# Patient Record
Sex: MALE | Race: WHITE | NOT HISPANIC OR LATINO | Employment: FULL TIME | URBAN - METROPOLITAN AREA
[De-identification: names, ages, dates, MRNs, and addresses within clinical notes are randomized per-mention and may not be internally consistent; named-entity substitution may affect disease eponyms.]

---

## 2017-10-31 ENCOUNTER — ALLIED HEALTH/NURSE VISIT (OUTPATIENT)
Dept: NURSING | Facility: CLINIC | Age: 57
End: 2017-10-31
Payer: COMMERCIAL

## 2017-10-31 DIAGNOSIS — Z23 NEED FOR PROPHYLACTIC VACCINATION AND INOCULATION AGAINST INFLUENZA: Primary | ICD-10-CM

## 2017-10-31 PROCEDURE — 90686 IIV4 VACC NO PRSV 0.5 ML IM: CPT

## 2017-10-31 PROCEDURE — 99207 ZZC NO CHARGE NURSE ONLY: CPT

## 2017-10-31 PROCEDURE — 90471 IMMUNIZATION ADMIN: CPT

## 2017-10-31 NOTE — MR AVS SNAPSHOT
"              After Visit Summary   10/31/2017    Wyatt Dhillon    MRN: [id number]           Patient Information     Date Of Birth          1960        Visit Information        Provider Department      10/31/2017 2:30 PM [facility] [place] NURSE [facility]        Today's Diagnoses     Need for prophylactic vaccination and inoculation against influenza    -  1       Follow-ups after your visit        Who to contact     If you have questions or need follow up information about today's clinic visit or your schedule please contact [facility] directly at [phone].  Normal or non-critical lab and imaging results will be communicated to you by Kitanihart, letter or phone within 4 business days after the clinic has received the results. If you do not hear from us within 7 days, please contact the clinic through datapinet or phone. If you have a critical or abnormal lab result, we will notify you by phone as soon as possible.  Submit refill requests through [employer] or call your pharmacy and they will forward the refill request to us. Please allow 3 business days for your refill to be completed.          Additional Information About Your Visit        MyChart Information     [employer] lets you send messages to your doctor, view your test results, renew your prescriptions, schedule appointments and more. To sign up, go to [email or web address] . Click on \"Log in\" on the left side of the screen, which will take you to the Welcome page. Then click on \"Sign up Now\" on the right side of the page.     You will be asked to enter the access code listed below, as well as some personal information. Please follow the directions to create your username and password.     Your access code is: [email or web address]  Expires: 2018  2:43 PM     Your access code will  in 90 days. If you need help or a new code, please call your [facility] or [phone].        Care EveryWhere ID     This is your Care EveryWhere ID. " This could be used by other organizations to access your [place] medical records  [id number]         Blood Pressure from Last 3 Encounters:   09/02/16 126/84   08/10/15 133/89   06/13/14 104/80    Weight from Last 3 Encounters:   09/02/16 212 lb (96.2 kg)   08/10/15 200 lb 14.4 oz (91.1 kg)   06/13/14 205 lb (93 kg)              We Performed the Following     FLU VAC, SPLIT VIRUS IM > 3 YO (QUADRIVALENT) [39149]     Vaccine Administration, Initial [75305]        Primary Care Provider Office Phone # Fax #    Wyatt Loco [phone] [address] [address]        Equal Access to Services     LYNDA LEE : Hadii lay rincon hadasho Sovaleriy, waaxda luqadaha, qaybta kaalmada adeegyalanre, nimesh lee . So [facility] [phone].    ATENCIÓN: Si habla español, tiene a stevenson disposición servicios gratuitos de asistencia lingüística. Llame al [phone].    We comply with applicable federal civil rights laws and [place] laws. We do not discriminate on the basis of race, color, national origin, age, disability, sex, sexual orientation, or gender identity.            Thank you!     Thank you for choosing [facility]  for your care. Our goal is always to provide you with excellent care. Hearing back from our patients is one way we can continue to improve our services. Please take a few minutes to complete the written survey that you may receive in the mail after your visit with us. Thank you!             Your Updated Medication List - Protect others around you: Learn how to safely use, store and throw away your medicines at www.disposemymeds.org.          This list is accurate as of: 10/31/17  2:43 PM.  Always use your most recent med list.                   Brand Name Dispense Instructions for use Diagnosis    ANTI-GAS Caps           BENADRYL ALLERGY 25 MG tablet   Generic drug:  diphenhydrAMINE      Take 25 mg by mouth every 6 hours as needed.         ibuprofen 200 MG tablet    ADVIL/MOTRIN     Take 200 mg by mouth every 4 hours as needed.

## 2017-10-31 NOTE — PROGRESS NOTES

## 2018-03-20 NOTE — PROGRESS NOTES
SUBJECTIVE:   CC: Wyatt Dhillon is an 57 year old male who presents for preventative health visit.     The patient is doing well and working out reg but weight up.  Occasionally has gerd, no chest pain or shortness of breath, no dysphagia.  He otherwise feels fine.    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  He is part owner of [employer].    Healthy Habits:    Do you get at least three servings of calcium containing foods daily (dairy, green leafy vegetables, etc.)? yes    Amount of exercise or daily activities, outside of work: 4 day(s) per week    Problems taking medications regularly No    Medication side effects: No    Have you had an eye exam in the past two years? yes    Do you see a dentist twice per year? yes    Do you have sleep apnea, excessive snoring or daytime drowsiness?yes           Today's PHQ-2 Score:   PHQ-2 ( 1999 Pfizer) 9/2/2016 8/10/2015   Q1: Little interest or pleasure in doing things 0 0   Q2: Feeling down, depressed or hopeless 0 0   PHQ-2 Score 0 0       Abuse: Current or Past(Physical, Sexual or Emotional)- No  Do you feel safe in your environment - Yes               Past Medical History:      Past Medical History:   Diagnosis Date     Anal fistula 2001    s/p repair     Carpal tunnel syndrome on both sides 2012     Cervical disc herniation 1986    C5-6     Elevated blood sugar      Gastroenteritis     while in [place]     Hx of colonoscopy 2008    nl     Insufficient sleep syndrome 2012    sleep test done     Normal stress echocardiogram 2002     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    one year of meds             Past Surgical History:      Past Surgical History:   Procedure Laterality Date     C REPAIR  ANAL FISTULA,RECT ADV FLAP  2001             Social History:     Social History     Social History     Marital status:      Spouse name: N/A     Number of children: 4     Years of education: N/A  "    Occupational History     runs shoe company [employer]     Social History Main Topics     Smoking status: Never Smoker     Smokeless tobacco: Never Used     Alcohol use 4.2 oz/week     7 Standard drinks or equivalent per week      Comment: drinks about 4 days/wk, 7 glasses of wine per week     Drug use: No     Sexual activity: Yes     Partners: Female     Other Topics Concern     Not on file     Social History Narrative             Family History:   reviewed         Allergies:     Allergies   Allergen Reactions     Codeine Camsylate Rash             Medications:     Current Outpatient Prescriptions   Medication Sig Dispense Refill     Alpha-D-Galactosidase (ANTI-GAS) CAPS        ibuprofen (ADVIL,MOTRIN) 200 MG tablet Take 200 mg by mouth every 4 hours as needed.       diphenhydrAMINE (BENADRYL ALLERGY) 25 MG tablet Take 25 mg by mouth every 6 hours as needed.                 Review of Systems:   The 10 point Review of Systems is negative other than noted in the HPI           Physical Exam:   Blood pressure 134/84, pulse 74, temperature 98.3  F (36.8  C), temperature source Oral, height 6' 3\" (1.905 m), weight 213 lb (96.6 kg), SpO2 100 %.    Exam:  Constitutional: healthy appearing, alert and in no distress  Heent: Normocephalic. Head without obvious masses or lesions. PERRLDC, EOMI. Mouth exam within normal limits: tongue, mucous membranes, posterior pharynx all normal, no lesions or abnormalities seen.  Tm's and canals within normal limits bilaterally. Neck supple, no nuchal rigidity or masses. No supraclavicular, or cervical adenopathy. Thyroid symmetric, no masses.  Cardiovascular: Regular rate and rhythm, no murmer, rub or gallops.  JVP not elevated, no edema.  Carotids within normal limits bilaterally, no bruits.  Respiratory: Normal respiratory effort.  Lungs clear, normal flow, no wheezing or crackles.  Breasts: Normal bilaterally.  No masses or lesions.  Nipples within normal limites.  No " axillary lesions or nodes.  Gastrointestinal: Normal active bowel sounds.   Soft, not tender, no masses, guarding or rebound.  No hepatosplenomegaly.   Genitourinary: Rectal min bph  Musculoskeletal: extremities normal, no gross deformities noted.  Skin: no suspicious lesions or rashes   Neurologic: Mental status within normal limits.  Speech fluent.  No gross motor abnormalities and gait intact.  Psychiatric: mentation appears normal and affect normal.         Data:   Labs sent; ekg - nsr, within normal limits.        Assessment:   1. Normal complete physical exam  2. Gerd, suspect more due to weight gain, doubt cv, rec exercise, diet, cut down alcohol and call if not resolving or changes  3. Elevated sugar, follow up labs  4. hcm         Plan:   He will get new shingles shot but did not want today  Exercise, diet  Letter with labs  Call if problems  Colon at the end of the year      Wyatt Loco M.D.

## 2018-03-22 ENCOUNTER — OFFICE VISIT (OUTPATIENT)
Dept: FAMILY MEDICINE | Facility: CLINIC | Age: 58
End: 2018-03-22
Payer: COMMERCIAL

## 2018-03-22 VITALS
OXYGEN SATURATION: 100 % | TEMPERATURE: 98.3 F | HEART RATE: 74 BPM | HEIGHT: 75 IN | WEIGHT: 213 LBS | BODY MASS INDEX: 26.49 KG/M2 | SYSTOLIC BLOOD PRESSURE: 134 MMHG | DIASTOLIC BLOOD PRESSURE: 84 MMHG

## 2018-03-22 DIAGNOSIS — R73.9 ELEVATED BLOOD SUGAR: ICD-10-CM

## 2018-03-22 DIAGNOSIS — Z00.00 ROUTINE GENERAL MEDICAL EXAMINATION AT A HEALTH CARE FACILITY: Primary | ICD-10-CM

## 2018-03-22 DIAGNOSIS — K21.9 GASTROESOPHAGEAL REFLUX DISEASE WITHOUT ESOPHAGITIS: ICD-10-CM

## 2018-03-22 LAB
ALBUMIN SERPL-MCNC: 4.1 G/DL (ref 3.4–5)
ALP SERPL-CCNC: 78 U/L (ref 40–150)
ALT SERPL W P-5'-P-CCNC: 59 U/L (ref 0–70)
ANION GAP SERPL CALCULATED.3IONS-SCNC: 3 MMOL/L (ref 3–14)
AST SERPL W P-5'-P-CCNC: 39 U/L (ref 0–45)
BASOPHILS # BLD AUTO: 0 10E9/L (ref 0–0.2)
BASOPHILS NFR BLD AUTO: 0.7 %
BILIRUB SERPL-MCNC: 0.7 MG/DL (ref 0.2–1.3)
BUN SERPL-MCNC: 18 MG/DL (ref 7–30)
CALCIUM SERPL-MCNC: 9.1 MG/DL (ref 8.5–10.1)
CHLORIDE SERPL-SCNC: 102 MMOL/L (ref 94–109)
CHOLEST SERPL-MCNC: 183 MG/DL
CO2 SERPL-SCNC: 31 MMOL/L (ref 20–32)
CREAT SERPL-MCNC: 0.98 MG/DL (ref 0.66–1.25)
DIFFERENTIAL METHOD BLD: NORMAL
EOSINOPHIL # BLD AUTO: 0 10E9/L (ref 0–0.7)
EOSINOPHIL NFR BLD AUTO: 0.9 %
ERYTHROCYTE [DISTWIDTH] IN BLOOD BY AUTOMATED COUNT: 13 % (ref 10–15)
GFR SERPL CREATININE-BSD FRML MDRD: 78 ML/MIN/1.7M2
GLUCOSE SERPL-MCNC: 102 MG/DL (ref 70–99)
HBA1C MFR BLD: 5.8 % (ref 4.3–6)
HCT VFR BLD AUTO: 45.1 % (ref 40–53)
HDLC SERPL-MCNC: 72 MG/DL
HGB BLD-MCNC: 15.2 G/DL (ref 13.3–17.7)
LDLC SERPL CALC-MCNC: 83 MG/DL
LYMPHOCYTES # BLD AUTO: 1.6 10E9/L (ref 0.8–5.3)
LYMPHOCYTES NFR BLD AUTO: 37.6 %
MCH RBC QN AUTO: 32.1 PG (ref 26.5–33)
MCHC RBC AUTO-ENTMCNC: 33.7 G/DL (ref 31.5–36.5)
MCV RBC AUTO: 95 FL (ref 78–100)
MONOCYTES # BLD AUTO: 0.6 10E9/L (ref 0–1.3)
MONOCYTES NFR BLD AUTO: 13 %
NEUTROPHILS # BLD AUTO: 2 10E9/L (ref 1.6–8.3)
NEUTROPHILS NFR BLD AUTO: 47.8 %
NONHDLC SERPL-MCNC: 111 MG/DL
PLATELET # BLD AUTO: 211 10E9/L (ref 150–450)
POTASSIUM SERPL-SCNC: 3.9 MMOL/L (ref 3.4–5.3)
PROT SERPL-MCNC: 7.6 G/DL (ref 6.8–8.8)
PSA SERPL-ACNC: 1.17 UG/L (ref 0–4)
RBC # BLD AUTO: 4.73 10E12/L (ref 4.4–5.9)
SODIUM SERPL-SCNC: 136 MMOL/L (ref 133–144)
TRIGL SERPL-MCNC: 141 MG/DL
WBC # BLD AUTO: 4.2 10E9/L (ref 4–11)

## 2018-03-22 PROCEDURE — G0103 PSA SCREENING: HCPCS | Performed by: INTERNAL MEDICINE

## 2018-03-22 PROCEDURE — 93010 ELECTROCARDIOGRAM REPORT: CPT | Performed by: INTERNAL MEDICINE

## 2018-03-22 PROCEDURE — 36415 COLL VENOUS BLD VENIPUNCTURE: CPT | Performed by: INTERNAL MEDICINE

## 2018-03-22 PROCEDURE — 80053 COMPREHEN METABOLIC PANEL: CPT | Performed by: INTERNAL MEDICINE

## 2018-03-22 PROCEDURE — 80061 LIPID PANEL: CPT | Performed by: INTERNAL MEDICINE

## 2018-03-22 PROCEDURE — 85025 COMPLETE CBC W/AUTO DIFF WBC: CPT | Performed by: INTERNAL MEDICINE

## 2018-03-22 PROCEDURE — 99396 PREV VISIT EST AGE 40-64: CPT | Performed by: INTERNAL MEDICINE

## 2018-03-22 PROCEDURE — 83036 HEMOGLOBIN GLYCOSYLATED A1C: CPT | Performed by: INTERNAL MEDICINE

## 2018-03-22 NOTE — NURSING NOTE
"Chief Complaint   Patient presents with     Physical       Initial /90  Pulse 74  Temp 98.3  F (36.8  C) (Oral)  Ht 6' 3\" (1.905 m)  Wt 213 lb (96.6 kg)  SpO2 100%  BMI 26.62 kg/m2 Estimated body mass index is 26.62 kg/(m^2) as calculated from the following:    Height as of this encounter: 6' 3\" (1.905 m).    Weight as of this encounter: 213 lb (96.6 kg).  Medication Reconciliation: complete   Maria L Page CMA      "

## 2018-03-22 NOTE — LETTER
[facility]  [address] [facility]  [address]  [place]  [address]  Tel: [phone]    March 22, 2018    Wyatt JAXON Dhillon  [address] [facility] [address]        Dear Mr. Dhillon,    It was a pleasure seeing you for your physical examination.  I wanted to get back to you with your test results.  I have enclosed a copy for your review.       I am happy to report that your cbc or complete blood count is normal with no signs of anemia, leukemia or platelet abnormalities.  Your chemistry panel shows no diabetes.  Your blood salts, kidney tests, liver tests, psa, ekg, and proteins are all fine.     Your total cholesterol is 183 with the normal range being below 200.  Your HDL or good cholesterol is 72 with the normal range being above 40.  Your LDL or bad cholesterol is 83 with the normal range being below 130.  These numbers are very good.     I am happy to bring you this overall excellent report.  Please be sure to exercise and keep your weight down.  If you have any questions please call me.   Sincerely,    Wyatt Loco MD        Results for orders placed or performed in visit on 03/22/18   CBC with platelets differential   Result Value Ref Range    WBC 4.2 4.0 - 11.0 10e9/L    RBC Count 4.73 4.4 - 5.9 10e12/L    Hemoglobin 15.2 13.3 - 17.7 g/dL    Hematocrit 45.1 40.0 - 53.0 %    MCV 95 78 - 100 fl    MCH 32.1 26.5 - 33.0 pg    MCHC 33.7 31.5 - 36.5 g/dL    RDW 13.0 10.0 - 15.0 %    Platelet Count 211 150 - 450 10e9/L    Diff Method Automated Method     % Neutrophils 47.8 %    % Lymphocytes 37.6 %    % Monocytes 13.0 %    % Eosinophils 0.9 %    % Basophils 0.7 %    Absolute Neutrophil 2.0 1.6 - 8.3 10e9/L    Absolute Lymphocytes 1.6 0.8 - 5.3 10e9/L    Absolute Monocytes 0.6 0.0 - 1.3 10e9/L    Absolute Eosinophils 0.0 0.0 - 0.7 10e9/L    Absolute Basophils 0.0 0.0 - 0.2 10e9/L   Comprehensive metabolic panel   Result Value Ref Range    Sodium 136 133 - 144 mmol/L    Potassium 3.9 3.4 -  5.3 mmol/L    Chloride 102 94 - 109 mmol/L    Carbon Dioxide 31 20 - 32 mmol/L    Anion Gap 3 3 - 14 mmol/L    Glucose 102 (H) 70 - 99 mg/dL    Urea Nitrogen 18 7 - 30 mg/dL    Creatinine 0.98 0.66 - 1.25 mg/dL    GFR Estimate 78 >60 mL/min/1.7m2    GFR Estimate If Black >90 >60 mL/min/1.7m2    Calcium 9.1 8.5 - 10.1 mg/dL    Bilirubin Total 0.7 0.2 - 1.3 mg/dL    Albumin 4.1 3.4 - 5.0 g/dL    Protein Total 7.6 6.8 - 8.8 g/dL    Alkaline Phosphatase 78 40 - 150 U/L    ALT 59 0 - 70 U/L    AST 39 0 - 45 U/L   Lipid panel reflex to direct LDL Non-fasting   Result Value Ref Range    Cholesterol 183 <200 mg/dL    Triglycerides 141 <150 mg/dL    HDL Cholesterol 72 >39 mg/dL    LDL Cholesterol Calculated 83 <100 mg/dL    Non HDL Cholesterol 111 <130 mg/dL   Prostate spec antigen screen   Result Value Ref Range    PSA 1.17 0 - 4 ug/L   Hemoglobin A1c   Result Value Ref Range    Hemoglobin A1C 5.8 4.3 - 6.0 %

## 2018-03-22 NOTE — MR AVS SNAPSHOT
After Visit Summary   3/22/2018    Wyatt Dhillon    MRN: [id number]           Patient Information     Date Of Birth          1960        Visit Information        Provider Department      3/22/2018 9:00 AM Wyatt Loco MD [facility]        Today's Diagnoses     Routine general medical examination at a health care facility    -  1    Elevated blood sugar        Gastroesophageal reflux disease without esophagitis          Care Instructions      Preventive Health Recommendations  Male Ages 50 - 64    Yearly exam:             See your health care provider every year in order to  o   Review health changes.   o   Discuss preventive care.    o   Review your medicines if your doctor has prescribed any.     Have a cholesterol test every 5 years, or more frequently if you are at risk for high cholesterol/heart disease.     Have a diabetes test (fasting glucose) every three years. If you are at risk for diabetes, you should have this test more often.     Have a colonoscopy at age 50, or have a yearly FIT test (stool test). These exams will check for colon cancer.      Talk with your health care provider about whether or not a prostate cancer screening test (PSA) is right for you.    You should be tested each year for STDs (sexually transmitted diseases), if you re at risk.     Shots: Get a flu shot each year. Get a tetanus shot every 10 years.     Nutrition:    Eat at least 5 servings of fruits and vegetables daily.     Eat whole-grain bread, whole-wheat pasta and brown rice instead of white grains and rice.     Talk to your provider about Calcium and Vitamin D.     Lifestyle    Exercise for at least 150 minutes a week (30 minutes a day, 5 days a week). This will help you control your weight and prevent disease.     Limit alcohol to one drink per day.     No smoking.     Wear sunscreen to prevent skin cancer.     See your dentist every six months for an exam and cleaning.     See your  "eye doctor every 1 to 2 years.            Follow-ups after your visit        Who to contact     If you have questions or need follow up information about today's clinic visit or your schedule please contact [facility] directly at [phone].  Normal or non-critical lab and imaging results will be communicated to you by MyChart, letter or phone within 4 business days after the clinic has received the results. If you do not hear from us within 7 days, please contact the clinic through [employer] or phone. If you have a critical or abnormal lab result, we will notify you by phone as soon as possible.  Submit refill requests through [employer] or call your pharmacy and they will forward the refill request to us. Please allow 3 business days for your refill to be completed.          Additional Information About Your Visit        [employer] Information     [employer] lets you send messages to your doctor, view your test results, renew your prescriptions, schedule appointments and more. To sign up, go to [email or web address] . Click on \"Log in\" on the left side of the screen, which will take you to the Welcome page. Then click on \"Sign up Now\" on the right side of the page.     You will be asked to enter the access code listed below, as well as some personal information. Please follow the directions to create your username and password.     Your access code is: [email or web address]  Expires: 2018  9:12 AM     Your access code will  in 90 days. If you need help or a new code, please call your [facility] or [phone].        Care EveryWhere ID     This is your Care EveryWhere ID. This could be used by other organizations to access your [place] medical records  [id number]        Your Vitals Were     Pulse Temperature Height Pulse Oximetry BMI (Body Mass Index)       74 98.3  F (36.8  C) (Oral) 6' 3\" (1.905 m) 100% 26.62 kg/m2        Blood Pressure from Last 3 Encounters:   18 134/84   16 126/84 "   08/10/15 133/89    Weight from Last 3 Encounters:   03/22/18 213 lb (96.6 kg)   09/02/16 212 lb (96.2 kg)   08/10/15 200 lb 14.4 oz (91.1 kg)              We Performed the Following     CBC with platelets differential     Comprehensive metabolic panel     EKG 12-LEAD CLINIC READ     Hemoglobin A1c     Lipid panel reflex to direct LDL Non-fasting     Prostate spec antigen screen        Primary Care Provider Office Phone # Fax #    Wyatt Loco [phone] [address] [facility] [address]  [facility] [address]        Equal Access to Services     [facility]: Hadii aad ku hadasho Sovaleriy, waaxda luqadaha, qaybta kaalmada adereggieyalanre, nimesh lee . So [facility] [phone].    ATENCIÓN: Si habla español, tiene a stevenson disposición servicios gratuitos de asistencia lingüística. [facility] [phone].    We comply with applicable federal civil rights laws and [place] laws. We do not discriminate on the basis of race, color, national origin, age, disability, sex, sexual orientation, or gender identity.            Thank you!     Thank you for choosing [facility]  for your care. Our goal is always to provide you with excellent care. Hearing back from our patients is one way we can continue to improve our services. Please take a few minutes to complete the written survey that you may receive in the mail after your visit with us. Thank you!             Your Updated Medication List - Protect others around you: Learn how to safely use, store and throw away your medicines at www.disposemymeds.org.          This list is accurate as of 3/22/18  9:12 AM.  Always use your most recent med list.                   Brand Name Dispense Instructions for use Diagnosis    ANTI-GAS Caps           BENADRYL ALLERGY 25 MG tablet   Generic drug:  diphenhydrAMINE      Take 25 mg by mouth every 6 hours as needed.        ibuprofen 200 MG tablet    ADVIL/MOTRIN     Take 200 mg by mouth every 4  hours as needed.

## 2018-03-22 NOTE — PROGRESS NOTES
It was a pleasure seeing you for your physical examination.  I wanted to get back to you with your test results.  I have enclosed a copy for your review.      I am happy to report that your cbc or complete blood count is normal with no signs of anemia, leukemia or platelet abnormalities.  Your chemistry panel shows no diabetes.  Your blood salts, kidney tests, liver tests, psa, ekg, and proteins are all fine.    Your total cholesterol is 183 with the normal range being below 200.  Your HDL or good cholesterol is 72 with the normal range being above 40.  Your LDL or bad cholesterol is 83 with the normal range being below 130.  These numbers are very good.    I am happy to bring you this overall excellent report.  Please be sure to exercise and keep your weight down.  If you have any questions please call me.

## 2018-10-11 ENCOUNTER — OFFICE VISIT (OUTPATIENT)
Dept: FAMILY MEDICINE | Facility: CLINIC | Age: 58
End: 2018-10-11
Payer: COMMERCIAL

## 2018-10-11 VITALS
WEIGHT: 200 LBS | DIASTOLIC BLOOD PRESSURE: 82 MMHG | BODY MASS INDEX: 25 KG/M2 | HEART RATE: 81 BPM | OXYGEN SATURATION: 99 % | SYSTOLIC BLOOD PRESSURE: 138 MMHG | RESPIRATION RATE: 16 BRPM | TEMPERATURE: 97.9 F

## 2018-10-11 DIAGNOSIS — J01.00 ACUTE MAXILLARY SINUSITIS, RECURRENCE NOT SPECIFIED: ICD-10-CM

## 2018-10-11 DIAGNOSIS — J01.91 ACUTE RECURRENT SINUSITIS, UNSPECIFIED LOCATION: Primary | ICD-10-CM

## 2018-10-11 PROCEDURE — 99213 OFFICE O/P EST LOW 20 MIN: CPT | Performed by: INTERNAL MEDICINE

## 2018-10-11 RX ORDER — PREDNISONE 20 MG/1
20 TABLET ORAL 2 TIMES DAILY
Qty: 10 TABLET | Refills: 1 | Status: ON HOLD | OUTPATIENT
Start: 2018-10-11 | End: 2019-01-07

## 2018-10-11 RX ORDER — AZITHROMYCIN 250 MG/1
TABLET, FILM COATED ORAL
Qty: 6 TABLET | Refills: 2 | Status: ON HOLD | OUTPATIENT
Start: 2018-10-11 | End: 2019-01-07

## 2018-10-11 NOTE — PROGRESS NOTES
SUBJECTIVE:   Wyatt Dhillon is a 58 year old male who presents to clinic today for the following health issues:      RESPIRATORY SYMPTOMS      Duration: 6 days    Description  cough, fatigue/malaise and congestion    Severity: mild    Accompanying signs and symptoms: None    History (predisposing factors):  none    Precipitating or alleviating factors: None    Therapies tried and outcome:  rest and fluids        Current Medications:     Current Outpatient Prescriptions   Medication Sig Dispense Refill     azithromycin (ZITHROMAX) 250 MG tablet Two tablets first day, then one tablet daily for four days. 6 tablet 2     predniSONE (DELTASONE) 20 MG tablet Take 1 tablet (20 mg) by mouth 2 times daily 10 tablet 1     Alpha-D-Galactosidase (ANTI-GAS) CAPS        diphenhydrAMINE (BENADRYL ALLERGY) 25 MG tablet Take 25 mg by mouth every 6 hours as needed.       ibuprofen (ADVIL,MOTRIN) 200 MG tablet Take 200 mg by mouth every 4 hours as needed.           Allergies:      Allergies   Allergen Reactions     Codeine Camsylate Rash            Past Medical History:     Past Medical History:   Diagnosis Date     Anal fistula 2001    s/p repair     Carpal tunnel syndrome on both sides 2012     Cervical disc herniation 1986    C5-6     Elevated blood sugar      Gastroenteritis     while in [place]     Hx of colonoscopy 2008    nl     Insufficient sleep syndrome 2012    sleep test done     Normal stress echocardiogram 2002     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    one year of meds         Past Surgical History:     Past Surgical History:   Procedure Laterality Date     C REPAIR  ANAL FISTULA,RECT ADV FLAP  2001         Family Medical History:     Family History   Problem Relation Age of Onset     Cancer Father      skin - squamous     Hypertension Father      Diabetes Mother      Hypertension Mother      Cancer - colorectal Maternal Grandfather      HEART DISEASE Maternal Grandfather      MI     Arthritis Paternal Grandfather      Cerebrovascular  Disease No family hx of          Social History:     Social History     Social History     Marital status:      Spouse name: N/A     Number of children: 4     Years of education: N/A     Occupational History     Not on file.     Social History Main Topics     Smoking status: Never Smoker     Smokeless tobacco: Never Used     Alcohol use 4.2 oz/week     7 Standard drinks or equivalent per week      Comment: drinks about 4 days/wk, 7 glasses of wine per week     Drug use: No     Sexual activity: Yes     Partners: Female     Other Topics Concern     Not on file     Social History Narrative           Review of System:     Constitutional: Negative for fever or chills  Skin: Negative for rashes  Ears/Nose/Throat: Negative for nasal congestion, sore throat, nasal congestion, sinusitis symptoms present  Respiratory: positive for cough  Cardiovascular: Negative for chest pain  Gastrointestinal: Negative for nausea, vomiting  Genitourinary: Negative for dysuria, hematuria  Musculoskeletal: Negative for myalgias  Neurologic: Negative for headaches  Psychiatric: Negative for depression, anxiety  Hematologic/Lymphatic/Immunologic: Negative  Endocrine: Negative  Behavioral: Negative for tobacco use       Physical Exam:   /82 (BP Location: Right arm, Cuff Size: Adult Regular)  Pulse 81  Temp 97.9  F (36.6  C) (Tympanic)  Resp 16  Wt 200 lb (90.7 kg)  SpO2 99%  BMI 25 kg/m2    GENERAL: alert and no distress  EYES: eyes grossly normal to inspection, and conjunctivae and sclerae normal  HENT: Normocephalic atraumatic. Nose and mouth without ulcers or lesions, nasal congestion present  NECK: supple  RESP: intermittent dry coughing spells present  CV: regular rate and rhythm, normal S1 S2  LYMPH: no peripheral edema   ABDOMEN: nondistended  MS: no gross musculoskeletal defects noted  SKIN: no suspicious lesions or rashes  NEURO: Alert & Oriented x 3.   PSYCH: mentation appears normal, affect normal        Diagnostic  Test Results:     Diagnostic Test Results:  Results for orders placed or performed in visit on 03/22/18   CBC with platelets differential   Result Value Ref Range    WBC 4.2 4.0 - 11.0 10e9/L    RBC Count 4.73 4.4 - 5.9 10e12/L    Hemoglobin 15.2 13.3 - 17.7 g/dL    Hematocrit 45.1 40.0 - 53.0 %    MCV 95 78 - 100 fl    MCH 32.1 26.5 - 33.0 pg    MCHC 33.7 31.5 - 36.5 g/dL    RDW 13.0 10.0 - 15.0 %    Platelet Count 211 150 - 450 10e9/L    Diff Method Automated Method     % Neutrophils 47.8 %    % Lymphocytes 37.6 %    % Monocytes 13.0 %    % Eosinophils 0.9 %    % Basophils 0.7 %    Absolute Neutrophil 2.0 1.6 - 8.3 10e9/L    Absolute Lymphocytes 1.6 0.8 - 5.3 10e9/L    Absolute Monocytes 0.6 0.0 - 1.3 10e9/L    Absolute Eosinophils 0.0 0.0 - 0.7 10e9/L    Absolute Basophils 0.0 0.0 - 0.2 10e9/L   Comprehensive metabolic panel   Result Value Ref Range    Sodium 136 133 - 144 mmol/L    Potassium 3.9 3.4 - 5.3 mmol/L    Chloride 102 94 - 109 mmol/L    Carbon Dioxide 31 20 - 32 mmol/L    Anion Gap 3 3 - 14 mmol/L    Glucose 102 (H) 70 - 99 mg/dL    Urea Nitrogen 18 7 - 30 mg/dL    Creatinine 0.98 0.66 - 1.25 mg/dL    GFR Estimate 78 >60 mL/min/1.7m2    GFR Estimate If Black >90 >60 mL/min/1.7m2    Calcium 9.1 8.5 - 10.1 mg/dL    Bilirubin Total 0.7 0.2 - 1.3 mg/dL    Albumin 4.1 3.4 - 5.0 g/dL    Protein Total 7.6 6.8 - 8.8 g/dL    Alkaline Phosphatase 78 40 - 150 U/L    ALT 59 0 - 70 U/L    AST 39 0 - 45 U/L   Lipid panel reflex to direct LDL Non-fasting   Result Value Ref Range    Cholesterol 183 <200 mg/dL    Triglycerides 141 <150 mg/dL    HDL Cholesterol 72 >39 mg/dL    LDL Cholesterol Calculated 83 <100 mg/dL    Non HDL Cholesterol 111 <130 mg/dL   Prostate spec antigen screen   Result Value Ref Range    PSA 1.17 0 - 4 ug/L   Hemoglobin A1c   Result Value Ref Range    Hemoglobin A1C 5.8 4.3 - 6.0 %       ASSESSMENT/PLAN:       (J01.91) Acute recurrent sinusitis, unspecified location  (primary encounter  diagnosis)  Comment: several days of new recurrent sinusitis symptoms  Plan: I have prescribed predniSONE (DELTASONE) 20 MG tablet, azithromycin (ZITHROMAX) 250 MG tablet for treatment    Follow Up Plan:     Patient is instructed to return to Internal Medicine clinic for follow-up visit in 1 week.        Mary South MD  Internal Medicine  [facility]

## 2018-10-11 NOTE — MR AVS SNAPSHOT
"              After Visit Summary   10/11/2018    Wyatt Dhillon    MRN: [id number]           Patient Information     Date Of Birth          1960        Visit Information        Provider Department      10/11/2018 11:20 AM Mary South MD [facility] [place]        Today's Diagnoses     Acute recurrent sinusitis, unspecified location    -  1    Acute maxillary sinusitis, recurrence not specified           Follow-ups after your visit        Who to contact     If you have questions or need follow up information about today's clinic visit or your schedule please contact [facility] directly at [phone].  Normal or non-critical lab and imaging results will be communicated to you by MyChart, letter or phone within 4 business days after the clinic has received the results. If you do not hear from us within 7 days, please contact the clinic through [employer] or phone. If you have a critical or abnormal lab result, we will notify you by phone as soon as possible.  Submit refill requests through [employer] or call your pharmacy and they will forward the refill request to us. Please allow 3 business days for your refill to be completed.          Additional Information About Your Visit        MyChart Information     [employer] lets you send messages to your doctor, view your test results, renew your prescriptions, schedule appointments and more. To sign up, go to [email or web address] . Click on \"Log in\" on the left side of the screen, which will take you to the Welcome page. Then click on \"Sign up Now\" on the right side of the page.     You will be asked to enter the access code listed below, as well as some personal information. Please follow the directions to create your username and password.     Your access code is: [email or web address]  Expires: 2019 11:14 AM     Your access code will  in 90 days. If you need help or a new code, please call your [facility] or [phone].        Care " EveryWhere ID     This is your Care EveryWhere ID. This could be used by other organizations to access your [place] medical records  [id number]        Your Vitals Were     Pulse Temperature Respirations Pulse Oximetry BMI (Body Mass Index)       81 97.9  F (36.6  C) (Tympanic) 16 99% 25 kg/m2        Blood Pressure from Last 3 Encounters:   10/11/18 138/82   03/22/18 134/84   09/02/16 126/84    Weight from Last 3 Encounters:   10/11/18 200 lb (90.7 kg)   03/22/18 213 lb (96.6 kg)   09/02/16 212 lb (96.2 kg)              Today, you had the following     No orders found for display         Today's Medication Changes          These changes are accurate as of 10/11/18 11:47 AM.  If you have any questions, ask your nurse or doctor.               Start taking these medicines.        Dose/Directions    azithromycin 250 MG tablet   Commonly known as:  ZITHROMAX   Used for:  Acute maxillary sinusitis, recurrence not specified   Started by:  Mary South MD        Two tablets first day, then one tablet daily for four days.   Quantity:  6 tablet   Refills:  2       predniSONE 20 MG tablet   Commonly known as:  DELTASONE   Used for:  Acute recurrent sinusitis, unspecified location   Started by:  Mary South MD        Dose:  20 mg   Take 1 tablet (20 mg) by mouth 2 times daily   Quantity:  10 tablet   Refills:  1            Where to get your medicines      Some of these will need a paper prescription and others can be bought over the counter.  Ask your nurse if you have questions.     Bring a paper prescription for each of these medications     azithromycin 250 MG tablet    predniSONE 20 MG tablet                Primary Care Provider Office Phone # Fax #    Wyatt Loco [phone] [address] [facility] [address]  [facility] [address]        Equal Access to Services     LYNDA LEE AH: Ramiro Mitchell, nabeel chen, nimesh tam  lagareth lange. So [facility] [phone].    ATENCIÓN: Si habla luciana, tiene a stevenson disposición servicios gratuitos de asistencia lingüística. Anastasiia al [phone].    We comply with applicable federal civil rights laws and [place] laws. We do not discriminate on the basis of race, color, national origin, age, disability, sex, sexual orientation, or gender identity.            Thank you!     Thank you for choosing [facility]  for your care. Our goal is always to provide you with excellent care. Hearing back from our patients is one way we can continue to improve our services. Please take a few minutes to complete the written survey that you may receive in the mail after your visit with us. Thank you!             Your Updated Medication List - Protect others around you: Learn how to safely use, store and throw away your medicines at www.disposemymeds.org.          This list is accurate as of 10/11/18 11:47 AM.  Always use your most recent med list.                   Brand Name Dispense Instructions for use Diagnosis    ANTI-GAS Caps           azithromycin 250 MG tablet    ZITHROMAX    6 tablet    Two tablets first day, then one tablet daily for four days.    Acute maxillary sinusitis, recurrence not specified       BENADRYL ALLERGY 25 MG tablet   Generic drug:  diphenhydrAMINE      Take 25 mg by mouth every 6 hours as needed.        ibuprofen 200 MG tablet    ADVIL/MOTRIN     Take 200 mg by mouth every 4 hours as needed.        predniSONE 20 MG tablet    DELTASONE    10 tablet    Take 1 tablet (20 mg) by mouth 2 times daily    Acute recurrent sinusitis, unspecified location

## 2018-10-11 NOTE — NURSING NOTE
"Chief Complaint   Patient presents with     URI     initial /82 (BP Location: Right arm, Cuff Size: Adult Regular)  Pulse 81  Temp 97.9  F (36.6  C) (Tympanic)  Resp 16  Wt 200 lb (90.7 kg)  SpO2 99%  BMI 25 kg/m2 Estimated body mass index is 25 kg/(m^2) as calculated from the following:    Height as of 3/22/18: 6' 3\" (1.905 m).    Weight as of this encounter: 200 lb (90.7 kg).  BP completed using cuff size: regular.   R arm      Health Maintenance that is potentially due pending provider review:  NONE    n/a    Gunner Carter ma  "

## 2019-01-07 ENCOUNTER — HOSPITAL ENCOUNTER (OUTPATIENT)
Facility: CLINIC | Age: 59
Discharge: HOME | End: 2019-01-07
Attending: COLON & RECTAL SURGERY | Admitting: COLON & RECTAL SURGERY
Payer: COMMERCIAL

## 2019-01-07 VITALS
SYSTOLIC BLOOD PRESSURE: 117 MMHG | OXYGEN SATURATION: 98 % | HEART RATE: 67 BPM | BODY MASS INDEX: 24.96 KG/M2 | WEIGHT: 205 LBS | HEIGHT: 76 IN | DIASTOLIC BLOOD PRESSURE: 80 MMHG | RESPIRATION RATE: 7 BRPM

## 2019-01-07 LAB — COLONOSCOPY: NORMAL

## 2019-01-07 PROCEDURE — G0121 COLON CA SCRN NOT HI RSK IND: HCPCS | Performed by: COLON & RECTAL SURGERY

## 2019-01-07 PROCEDURE — G0500 MOD SEDAT ENDO SERVICE >5YRS: HCPCS | Performed by: COLON & RECTAL SURGERY

## 2019-01-07 PROCEDURE — 25000128 H RX IP 250 OP 636: Performed by: COLON & RECTAL SURGERY

## 2019-01-07 PROCEDURE — 45378 DIAGNOSTIC COLONOSCOPY: CPT | Performed by: COLON & RECTAL SURGERY

## 2019-01-07 RX ORDER — DIPHENHYDRAMINE HYDROCHLORIDE 50 MG/ML
INJECTION INTRAMUSCULAR; INTRAVENOUS PRN
Status: DISCONTINUED | OUTPATIENT
Start: 2019-01-07 | End: 2019-01-07 | Stop reason: HOSPADM

## 2019-01-07 RX ORDER — LIDOCAINE 40 MG/G
CREAM TOPICAL
Status: DISCONTINUED | OUTPATIENT
Start: 2019-01-07 | End: 2019-01-07 | Stop reason: HOSPADM

## 2019-01-07 RX ORDER — ONDANSETRON 4 MG/1
4 TABLET, ORALLY DISINTEGRATING ORAL EVERY 6 HOURS PRN
Status: DISCONTINUED | OUTPATIENT
Start: 2019-01-07 | End: 2019-01-07 | Stop reason: HOSPADM

## 2019-01-07 RX ORDER — DIPHENHYDRAMINE HYDROCHLORIDE 50 MG/ML
25 INJECTION INTRAMUSCULAR; INTRAVENOUS EVERY 4 HOURS PRN
Status: DISCONTINUED | OUTPATIENT
Start: 2019-01-07 | End: 2019-01-07 | Stop reason: HOSPADM

## 2019-01-07 RX ORDER — FENTANYL CITRATE 50 UG/ML
INJECTION, SOLUTION INTRAMUSCULAR; INTRAVENOUS PRN
Status: DISCONTINUED | OUTPATIENT
Start: 2019-01-07 | End: 2019-01-07 | Stop reason: HOSPADM

## 2019-01-07 RX ORDER — ONDANSETRON 2 MG/ML
4 INJECTION INTRAMUSCULAR; INTRAVENOUS
Status: DISCONTINUED | OUTPATIENT
Start: 2019-01-07 | End: 2019-01-07 | Stop reason: HOSPADM

## 2019-01-07 RX ORDER — DIPHENHYDRAMINE HCL 25 MG
25 CAPSULE ORAL EVERY 4 HOURS PRN
Status: DISCONTINUED | OUTPATIENT
Start: 2019-01-07 | End: 2019-01-07 | Stop reason: HOSPADM

## 2019-01-07 RX ORDER — PROCHLORPERAZINE MALEATE 10 MG
10 TABLET ORAL EVERY 6 HOURS PRN
Status: DISCONTINUED | OUTPATIENT
Start: 2019-01-07 | End: 2019-01-07 | Stop reason: HOSPADM

## 2019-01-07 RX ORDER — NALOXONE HYDROCHLORIDE 0.4 MG/ML
.1-.4 INJECTION, SOLUTION INTRAMUSCULAR; INTRAVENOUS; SUBCUTANEOUS
Status: DISCONTINUED | OUTPATIENT
Start: 2019-01-07 | End: 2019-01-07 | Stop reason: HOSPADM

## 2019-01-07 RX ORDER — ONDANSETRON 2 MG/ML
4 INJECTION INTRAMUSCULAR; INTRAVENOUS EVERY 6 HOURS PRN
Status: DISCONTINUED | OUTPATIENT
Start: 2019-01-07 | End: 2019-01-07 | Stop reason: HOSPADM

## 2019-01-07 RX ORDER — MULTIVIT WITH MINERALS/LUTEIN
1 TABLET ORAL DAILY
COMMUNITY
End: 2019-05-13

## 2019-01-07 RX ORDER — FLUMAZENIL 0.1 MG/ML
0.2 INJECTION, SOLUTION INTRAVENOUS
Status: DISCONTINUED | OUTPATIENT
Start: 2019-01-07 | End: 2019-01-07 | Stop reason: HOSPADM

## 2019-01-07 ASSESSMENT — MIFFLIN-ST. JEOR: SCORE: 1833.43

## 2019-01-08 ENCOUNTER — DOCUMENTATION ONLY (OUTPATIENT)
Dept: OTHER | Facility: CLINIC | Age: 59
End: 2019-01-08

## 2019-04-17 ENCOUNTER — TRANSFERRED RECORDS (OUTPATIENT)
Dept: HEALTH INFORMATION MANAGEMENT | Facility: CLINIC | Age: 59
End: 2019-04-17

## 2019-05-01 ENCOUNTER — TRANSFERRED RECORDS (OUTPATIENT)
Dept: HEALTH INFORMATION MANAGEMENT | Facility: CLINIC | Age: 59
End: 2019-05-01

## 2019-05-09 ENCOUNTER — TRANSFERRED RECORDS (OUTPATIENT)
Dept: HEALTH INFORMATION MANAGEMENT | Facility: CLINIC | Age: 59
End: 2019-05-09

## 2019-05-13 ENCOUNTER — OFFICE VISIT (OUTPATIENT)
Dept: FAMILY MEDICINE | Facility: CLINIC | Age: 59
End: 2019-05-13
Payer: COMMERCIAL

## 2019-05-13 VITALS
HEART RATE: 79 BPM | BODY MASS INDEX: 25.94 KG/M2 | SYSTOLIC BLOOD PRESSURE: 142 MMHG | OXYGEN SATURATION: 97 % | WEIGHT: 213 LBS | TEMPERATURE: 97.2 F | DIASTOLIC BLOOD PRESSURE: 92 MMHG | HEIGHT: 76 IN

## 2019-05-13 DIAGNOSIS — Z01.818 PREOP GENERAL PHYSICAL EXAM: Primary | ICD-10-CM

## 2019-05-13 DIAGNOSIS — M51.16 LUMBAR DISC HERNIATION WITH RADICULOPATHY: ICD-10-CM

## 2019-05-13 DIAGNOSIS — R73.9 ELEVATED BLOOD SUGAR: ICD-10-CM

## 2019-05-13 LAB
ANION GAP SERPL CALCULATED.3IONS-SCNC: 3 MMOL/L (ref 3–14)
BASOPHILS # BLD AUTO: 0 10E9/L (ref 0–0.2)
BASOPHILS NFR BLD AUTO: 0.6 %
BUN SERPL-MCNC: 22 MG/DL (ref 7–30)
CALCIUM SERPL-MCNC: 9.2 MG/DL (ref 8.5–10.1)
CHLORIDE SERPL-SCNC: 105 MMOL/L (ref 94–109)
CO2 SERPL-SCNC: 31 MMOL/L (ref 20–32)
CREAT SERPL-MCNC: 0.97 MG/DL (ref 0.66–1.25)
DIFFERENTIAL METHOD BLD: ABNORMAL
EOSINOPHIL # BLD AUTO: 0 10E9/L (ref 0–0.7)
EOSINOPHIL NFR BLD AUTO: 0.9 %
ERYTHROCYTE [DISTWIDTH] IN BLOOD BY AUTOMATED COUNT: 13.1 % (ref 10–15)
GFR SERPL CREATININE-BSD FRML MDRD: 84 ML/MIN/{1.73_M2}
GLUCOSE SERPL-MCNC: 100 MG/DL (ref 70–99)
HBA1C MFR BLD: 5.6 % (ref 0–5.6)
HCT VFR BLD AUTO: 42 % (ref 40–53)
HGB BLD-MCNC: 14.6 G/DL (ref 13.3–17.7)
LYMPHOCYTES # BLD AUTO: 1.7 10E9/L (ref 0.8–5.3)
LYMPHOCYTES NFR BLD AUTO: 37 %
MCH RBC QN AUTO: 33.3 PG (ref 26.5–33)
MCHC RBC AUTO-ENTMCNC: 34.8 G/DL (ref 31.5–36.5)
MCV RBC AUTO: 96 FL (ref 78–100)
MONOCYTES # BLD AUTO: 0.6 10E9/L (ref 0–1.3)
MONOCYTES NFR BLD AUTO: 12.7 %
NEUTROPHILS # BLD AUTO: 2.3 10E9/L (ref 1.6–8.3)
NEUTROPHILS NFR BLD AUTO: 48.8 %
PLATELET # BLD AUTO: 183 10E9/L (ref 150–450)
POTASSIUM SERPL-SCNC: 4.7 MMOL/L (ref 3.4–5.3)
RBC # BLD AUTO: 4.38 10E12/L (ref 4.4–5.9)
SODIUM SERPL-SCNC: 139 MMOL/L (ref 133–144)
WBC # BLD AUTO: 4.7 10E9/L (ref 4–11)

## 2019-05-13 PROCEDURE — 85025 COMPLETE CBC W/AUTO DIFF WBC: CPT | Performed by: NURSE PRACTITIONER

## 2019-05-13 PROCEDURE — 93000 ELECTROCARDIOGRAM COMPLETE: CPT | Performed by: NURSE PRACTITIONER

## 2019-05-13 PROCEDURE — 36415 COLL VENOUS BLD VENIPUNCTURE: CPT | Performed by: NURSE PRACTITIONER

## 2019-05-13 PROCEDURE — 80048 BASIC METABOLIC PNL TOTAL CA: CPT | Performed by: NURSE PRACTITIONER

## 2019-05-13 PROCEDURE — 83036 HEMOGLOBIN GLYCOSYLATED A1C: CPT | Performed by: NURSE PRACTITIONER

## 2019-05-13 PROCEDURE — 99214 OFFICE O/P EST MOD 30 MIN: CPT | Performed by: NURSE PRACTITIONER

## 2019-05-13 ASSESSMENT — MIFFLIN-ST. JEOR: SCORE: 1869.72

## 2019-05-13 NOTE — PROGRESS NOTES
"[facility]  [address] [facility] [address]  [phone]  Dept: [phone]    PRE-OP EVALUATION:  Today's date: 2019    Wyatt Dhillon (: 1960) presents for pre-operative evaluation assessment as requested by Dr. Vera.  He requires evaluation and anesthesia risk assessment prior to undergoing surgery/procedure for treatment of spine .    Proposed Surgery/ Procedure: LEFT L 5  S1 DISCECTOMY   Date of Surgery/ Procedure: 05/15/19  Time of Surgery/ Procedure: 12:35 p.m.  Hospital/Surgical Facility:  OR  Fax number for surgical facility:   Primary Physician: Wyatt Loco  Type of Anesthesia Anticipated: General    Patient has a Health Care Directive or Living Will:  {YES/NO:730878::\"NO\"}    {PREOP QUESTIONNAIRE OPTIONS(by MA):439087::\"1. NO - Do you have a history of heart attack, stroke, stent, bypass or surgery on an artery in the head, neck, heart or legs?\",\"2. NO - Do you ever have any pain or discomfort in your chest?\",\"3. NO - Do you have a history of  Heart Failure?\",\"4. NO - Are you troubled by shortness of breath when: walking on the level, up a slight hill or at night?\",\"5. NO - Do you currently have a cold, bronchitis or other respiratory infection?\",\"6. NO - Do you have a cough, shortness of breath or wheezing?\",\"7. NO - Do you sometimes get pains in the calves of your legs when you walk?\",\"8. NO - Do you or anyone in your family have previous history of blood clots?\",\"9. NO - Do you or does anyone in your family have a serious bleeding problem such as prolonged bleeding following surgeries or cuts?\",\"10. NO - Have you ever had problems with anemia or been told to take iron pills?\",\"11. NO - Have you had any abnormal blood loss such as black, tarry or bloody stools, or abnormal vaginal bleeding?\",\"12. NO - Have you ever had a blood transfusion?\",\"13. NO - Have you or any of your relatives ever had problems with anesthesia?\",\"14. NO - Do you have sleep " "apnea, excessive snoring or daytime drowsiness?\",\"15. NO - Do you have any prosthetic heart valves?\",\"16. NO - Do you have prosthetic joints?\",\"17. NO - Is there any chance that you may be pregnant?\"}      HPI:     HPI related to upcoming procedure: ***      {Ch. Problems:015977}    MEDICAL HISTORY:     Patient Active Problem List    Diagnosis Date Noted     Elevated blood sugar      Priority: Medium     Cervical disc herniation      Priority: Medium     C5-6       Carpal tunnel syndrome on both sides      Priority: Medium     Insufficient sleep syndrome      Priority: Medium     sleep test done       CARDIOVASCULAR SCREENING; LDL GOAL LESS THAN 130 03/27/2012     Priority: Medium      Past Medical History:   Diagnosis Date     Anal fistula 2001    s/p repair     Carpal tunnel syndrome on both sides 2012     Cervical disc herniation 1986    C5-6     Elevated blood sugar      Gastroenteritis     while in [place]     Hx of colonoscopy 2008    nl     Insufficient sleep syndrome 2012    sleep test done     Normal stress echocardiogram 2002     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    one year of meds     Past Surgical History:   Procedure Laterality Date     C REPAIR  ANAL FISTULA,RECT ADV FLAP  2001     COLONOSCOPY N/A 1/7/2019    Procedure: COLONOSCOPY;  Surgeon: Fco Cross MD;  Location:  GI     Current Outpatient Medications   Medication Sig Dispense Refill     Alpha-D-Galactosidase (ANTI-GAS) CAPS        diphenhydrAMINE (BENADRYL ALLERGY) 25 MG tablet Take 25 mg by mouth every 6 hours as needed.       ibuprofen (ADVIL,MOTRIN) 200 MG tablet Take 200 mg by mouth every 4 hours as needed.       multivitamin (CENTRUM SILVER) tablet Take 1 tablet by mouth daily       OTC products: {OTC ANALGESICS:980904}    Allergies   Allergen Reactions     Codeine Camsylate Rash      Latex Allergy: {YES/NO WITH DEFAULT:264585::\"NO\"}    Social History     Tobacco Use     Smoking status: Never Smoker     Smokeless tobacco: Never Used   Substance Use " "Topics     Alcohol use: Yes     Alcohol/week: 4.2 oz     Types: 7 Standard drinks or equivalent per week     Comment: drinks about 4 days/wk, 7 glasses of wine per week     History   Drug Use No       REVIEW OF SYSTEMS:   {ROS Preop Choices:368472}    EXAM:   There were no vitals taken for this visit.  {EXAM Preop Choices:263059}    DIAGNOSTICS:   {DIAGNOSTIC FOR PREOP:735842}    Recent Labs   Lab Test 03/22/18  0928 08/23/16  0909   HGB 15.2 14.3    201    137   POTASSIUM 3.9 4.7   CR 0.98 0.96   A1C 5.8  --         IMPRESSION:   {PREOP REASONS:121084::\"Reason for surgery/procedure: ***\",\"Diagnosis/reason for consult: ***\"}    The proposed surgical procedure is considered {HIGH=major cardiovascular or procedures requiring prolonged anesthesia >4 hours or large fluid shifts;    INTERMEDIATE=abdominal, most orthopedic and intrathoracic surgery; LOW= endoscopy, cataract and breast surgery:839293} risk.    REVISED CARDIAC RISK INDEX  The patient has the following serious cardiovascular risks for perioperative complications such as (MI, PE, VFib and 3  AV Block):  {PREOP REVISED CARDIAC INDEX (RCI):451222:p:\"No serious cardiac risks\"}  INTERPRETATION: {REVISED CARDIAC RISK INTERPRETATION:034746}    The patient has the following additional risks for perioperative complications:  {Additional perioperative risks:973387:p:\"No identified additional risks\"}      ICD-10-CM    1. Preop general physical exam Z01.818        RECOMMENDATIONS:     {IMPORTANT - Conditions - complete carefully!!:553660}    {IMPORTANT - Medications:854799::\"--Patient is to take all scheduled medications on the day of surgery EXCEPT for modifications listed below.\"}    {IMPORTANT - Approval:237120:p:\"APPROVAL GIVEN to proceed with proposed procedure, without further diagnostic evaluation\"}       Signed Electronically by: MANPREET Obando CNP    Copy of this evaluation report is provided to requesting physician.    [facility] Preop " Guidelines    Revised Cardiac Risk Index

## 2019-05-13 NOTE — LETTER
[facility]  [address] [facility] [facility] [address]  [address]  [place]  [address]  Tel: [phone]    May 14, 2019    Wyatt Dhillon  [address] [facility] [address]        Dear Mr. Danielwillis,    You have been cleared for surgery, Wyatt.   Resulted Orders   Hemoglobin A1c   Result Value Ref Range    Hemoglobin A1C 5.6 0 - 5.6 %      Comment:      Normal <5.7% Prediabetes 5.7-6.4%  Diabetes 6.5% or higher - adopted from ADA   consensus guidelines.     CBC with platelets and differential   Result Value Ref Range    WBC 4.7 4.0 - 11.0 10e9/L    RBC Count 4.38 (L) 4.4 - 5.9 10e12/L    Hemoglobin 14.6 13.3 - 17.7 g/dL    Hematocrit 42.0 40.0 - 53.0 %    MCV 96 78 - 100 fl    MCH 33.3 (H) 26.5 - 33.0 pg    MCHC 34.8 31.5 - 36.5 g/dL    RDW 13.1 10.0 - 15.0 %    Platelet Count 183 150 - 450 10e9/L    % Neutrophils 48.8 %    % Lymphocytes 37.0 %    % Monocytes 12.7 %    % Eosinophils 0.9 %    % Basophils 0.6 %    Absolute Neutrophil 2.3 1.6 - 8.3 10e9/L    Absolute Lymphocytes 1.7 0.8 - 5.3 10e9/L    Absolute Monocytes 0.6 0.0 - 1.3 10e9/L    Absolute Eosinophils 0.0 0.0 - 0.7 10e9/L    Absolute Basophils 0.0 0.0 - 0.2 10e9/L    Diff Method Automated Method    Basic metabolic panel   Result Value Ref Range    Sodium 139 133 - 144 mmol/L    Potassium 4.7 3.4 - 5.3 mmol/L    Chloride 105 94 - 109 mmol/L    Carbon Dioxide 31 20 - 32 mmol/L    Anion Gap 3 3 - 14 mmol/L    Glucose 100 (H) 70 - 99 mg/dL      Comment:      Non Fasting    Urea Nitrogen 22 7 - 30 mg/dL    Creatinine 0.97 0.66 - 1.25 mg/dL    GFR Estimate 84 >60 mL/min/[1.73_m2]      Comment:      Non  GFR Calc  Starting 12/18/2018, serum creatinine based estimated GFR (eGFR) will be   calculated using the Chronic Kidney Disease Epidemiology Collaboration   (CKD-EPI) equation.      GFR Estimate If Black >90 >60 mL/min/[1.73_m2]      Comment:       GFR Calc  Starting 12/18/2018, serum creatinine based estimated GFR (eGFR)  will be   calculated using the Chronic Kidney Disease Epidemiology Collaboration   (CKD-EPI) equation.      Calcium 9.2 8.5 - 10.1 mg/dL         If you have any further questions or problems, please contact our office.      Sincerely,    Suzette Reynaga NP / karen

## 2019-05-13 NOTE — PROGRESS NOTES
[address] [address]  [phone]  Dept: [phone]    PRE-OP EVALUATION:  Today's date: 2019    Wyatt COATES Fredywillis (: 1960) presents for pre-operative evaluation assessment as requested by Dr. Vera .  He requires evaluation and anesthesia risk assessment prior to undergoing surgery/procedure for treatment of LEFT L 5  S1 DISCECTOMY ( SHEEHAN FRAME ; ERLINDA ) .    Proposed Surgery/ Procedure: LEFT L 5  S1 DISCECTOMY ( SHEEHAN FRAME ; ERLINDA )  Date of Surgery/ Procedure: 5/15/19  Time of Surgery/ Procedure: @ 12:35 PM  Hospital/Surgical Facility:  OR  Fax number for surgical facility: [facility]   Primary Physician: Wyatt Loco  Type of Anesthesia Anticipated: General    Patient has a Health Care Directive or Living Will:  YES     1. NO - Do you have a history of heart attack, stroke, stent, bypass or surgery on an artery in the head, neck, heart or legs?  2. NO - Do you ever have any pain or discomfort in your chest?  3. NO - Do you have a history of  Heart Failure?  4. NO - Are you troubled by shortness of breath when: walking on the level, up a slight hill or at night?  5. NO - Do you currently have a cold, bronchitis or other respiratory infection?  6. NO - Do you have a cough, shortness of breath or wheezing?  7. Yes - Do you sometimes get pains in the calves of your legs when you walk? Related to disc herniation  8. Yes  - Do you or anyone in your family have previous history of blood clots? Not self  9. NO - Do you or does anyone in your family have a serious bleeding problem such as prolonged bleeding following surgeries or cuts?  10. NO - Have you ever had problems with anemia or been told to take iron pills?  11. NO - Have you had any abnormal blood loss such as black, tarry or bloody stools, or abnormal vaginal bleeding?  12. NO - Have you ever had a blood transfusion?  13. NO - Have you or any of your relatives ever had problems with  anesthesia?  14. Yes - Do you have sleep apnea, excessive snoring or daytime drowsiness?  snores  15. NO - Do you have any prosthetic heart valves?  16. NO - Do you have prosthetic joints?  17. NO - Is there any chance that you may be pregnant?      HPI:     HPI related to upcoming procedure: herniated L5-S1 doing weights 4/10/19    See problem list for active medical problems.  Problems all longstanding and stable, except as noted/documented.  See ROS for pertinent symptoms related to these conditions.                                                                                                                                                          .    MEDICAL HISTORY:     Patient Active Problem List    Diagnosis Date Noted     Elevated blood sugar      Priority: Medium     Cervical disc herniation      Priority: Medium     C5-6       Carpal tunnel syndrome on both sides      Priority: Medium     Insufficient sleep syndrome      Priority: Medium     sleep test done       CARDIOVASCULAR SCREENING; LDL GOAL LESS THAN 130 03/27/2012     Priority: Medium      Past Medical History:   Diagnosis Date     Anal fistula 2001    s/p repair     Carpal tunnel syndrome on both sides 2012     Cervical disc herniation 1986    C5-6     Elevated blood sugar      Gastroenteritis     while in [place]     Hx of colonoscopy 2008    nl     Insufficient sleep syndrome 2012    sleep test done     Normal stress echocardiogram 2002     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    one year of meds     Past Surgical History:   Procedure Laterality Date     C REPAIR  ANAL FISTULA,RECT ADV FLAP  2001     COLONOSCOPY N/A 1/7/2019    Procedure: COLONOSCOPY;  Surgeon: Fco Cross MD;  Location:  GI     Current Outpatient Medications   Medication Sig Dispense Refill     diphenhydrAMINE (BENADRYL ALLERGY) 25 MG tablet Take 25 mg by mouth every 6 hours as needed.       OTC products: NSAIDS stopped on 5/10/19 at surgeons instruction    Allergies   Allergen  "Reactions     Codeine Camsylate Rash      Latex Allergy: NO    Social History     Tobacco Use     Smoking status: Never Smoker     Smokeless tobacco: Never Used   Substance Use Topics     Alcohol use: Yes     Alcohol/week: 4.2 oz     Types: 7 Standard drinks or equivalent per week     Comment: drinks about 4 days/wk, 7 glasses of wine per week     History   Drug Use No       REVIEW OF SYSTEMS:   CONSTITUTIONAL: NEGATIVE for fever, chills, change in weight  INTEGUMENTARY/SKIN: NEGATIVE for worrisome rashes, moles or lesions  EYES: NEGATIVE for vision changes or irritation  ENT/MOUTH: NEGATIVE for ear, mouth and throat problems  RESP: NEGATIVE for significant cough or SOB  CV: NEGATIVE for chest pain, palpitations or peripheral edema  GI: NEGATIVE for nausea, abdominal pain, heartburn, or change in bowel habits  : NEGATIVE for frequency, dysuria, or hematuria  MUSCULOSKELETAL: NEGATIVE for joint swelling or inflammation  NEURO: NEGATIVE for weakness, dizziness POSITIVE for left LE radiculopathy  ENDOCRINE: NEGATIVE for temperature intolerance, skin/hair changes  HEME: NEGATIVE for bleeding problems  PSYCHIATRIC: NEGATIVE for changes in mood or affect    EXAM:   BP (!) 142/92 (BP Location: Left arm, Cuff Size: Adult Large)   Pulse 79   Temp 97.2  F (36.2  C) (Oral)   Ht 1.918 m (6' 3.5\")   Wt 96.6 kg (213 lb)   SpO2 97%   BMI 26.27 kg/m      GENERAL APPEARANCE: healthy, alert and no distress     EYES: EOMI,  PERRL     HENT: ear canals and TM's normal and nose and mouth without ulcers or lesions     NECK: no adenopathy, no asymmetry, masses, or scars and thyroid normal to palpation     RESP: lungs clear to auscultation - no rales, rhonchi or wheezes     CV: regular rates and rhythm, normal S1 S2, no S3 or S4 and no murmur, click or rub     ABDOMEN:  soft, nontender, no HSM or masses and bowel sounds normal     MS: extremities normal- no gross deformities noted, no evidence of inflammation in joints, FROM in all " extremities.     SKIN: no suspicious lesions or rashes     NEURO: Normal strength and tone, sensory exam grossly normal, mentation intact and speech normal     PSYCH: mentation appears normal. and affect normal/bright     LYMPHATICS: No cervical adenopathy    DIAGNOSTICS:   EKG: Sinus  Rhythm   -RSR(V1) -nondiagnostic.    -Left atrial enlargement.     BORDERLINE      Recent Labs   Lab Test 03/22/18  0928 08/23/16  0909   HGB 15.2 14.3    201    137   POTASSIUM 3.9 4.7   CR 0.98 0.96   A1C 5.8  --       Results for orders placed or performed in visit on 05/13/19 (from the past 24 hour(s))   Hemoglobin A1c   Result Value Ref Range    Hemoglobin A1C 5.6 0 - 5.6 %   CBC with platelets and differential   Result Value Ref Range    WBC 4.7 4.0 - 11.0 10e9/L    RBC Count 4.38 (L) 4.4 - 5.9 10e12/L    Hemoglobin 14.6 13.3 - 17.7 g/dL    Hematocrit 42.0 40.0 - 53.0 %    MCV 96 78 - 100 fl    MCH 33.3 (H) 26.5 - 33.0 pg    MCHC 34.8 31.5 - 36.5 g/dL    RDW 13.1 10.0 - 15.0 %    Platelet Count 183 150 - 450 10e9/L    % Neutrophils 48.8 %    % Lymphocytes 37.0 %    % Monocytes 12.7 %    % Eosinophils 0.9 %    % Basophils 0.6 %    Absolute Neutrophil 2.3 1.6 - 8.3 10e9/L    Absolute Lymphocytes 1.7 0.8 - 5.3 10e9/L    Absolute Monocytes 0.6 0.0 - 1.3 10e9/L    Absolute Eosinophils 0.0 0.0 - 0.7 10e9/L    Absolute Basophils 0.0 0.0 - 0.2 10e9/L    Diff Method Automated Method    Basic metabolic panel   Result Value Ref Range    Sodium 139 133 - 144 mmol/L    Potassium 4.7 3.4 - 5.3 mmol/L    Chloride 105 94 - 109 mmol/L    Carbon Dioxide 31 20 - 32 mmol/L    Anion Gap 3 3 - 14 mmol/L    Glucose 100 (H) 70 - 99 mg/dL    Urea Nitrogen 22 7 - 30 mg/dL    Creatinine 0.97 0.66 - 1.25 mg/dL    GFR Estimate 84 >60 mL/min/[1.73_m2]    GFR Estimate If Black >90 >60 mL/min/[1.73_m2]    Calcium 9.2 8.5 - 10.1 mg/dL     IMPRESSION:   Reason for surgery/procedure:L5-S1 disc herniation for microdiscectomy  Diagnosis/reason for  consult: pre op consult    The proposed surgical procedure is considered INTERMEDIATE risk.    REVISED CARDIAC RISK INDEX  The patient has the following serious cardiovascular risks for perioperative complications such as (MI, PE, VFib and 3  AV Block):  No serious cardiac risks  INTERPRETATION: 0 risks: Class I (very low risk - 0.4% complication rate)    The patient has the following additional risks for perioperative complications:  No identified additional risks      ICD-10-CM    1. Preop general physical exam Z01.818 EKG 12-lead complete w/read - Clinics     Hemoglobin A1c     CBC with platelets and differential     Basic metabolic panel   2. Lumbar disc herniation with radiculopathy M51.16    3. Elevated blood sugar R73.9        RECOMMENDATIONS:       --Patient is to take all scheduled medications on the day of surgery EXCEPT for modifications listed below.  --Patient is on no chronic medications    APPROVAL GIVEN to proceed with proposed procedure, without further diagnostic evaluation       Signed Electronically by: MANPREET Obando CNP    Copy of this evaluation report is provided to requesting physician.    [facility] Preop Guidelines    Revised Cardiac Risk Index

## 2019-05-15 ENCOUNTER — ANESTHESIA EVENT (OUTPATIENT)
Dept: SURGERY | Facility: CLINIC | Age: 59
End: 2019-05-15
Payer: COMMERCIAL

## 2019-05-15 ENCOUNTER — HOSPITAL ENCOUNTER (OUTPATIENT)
Facility: CLINIC | Age: 59
Discharge: HOME | End: 2019-05-15
Attending: ORTHOPAEDIC SURGERY | Admitting: ORTHOPAEDIC SURGERY
Payer: COMMERCIAL

## 2019-05-15 ENCOUNTER — ANESTHESIA (OUTPATIENT)
Dept: SURGERY | Facility: CLINIC | Age: 59
End: 2019-05-15
Payer: COMMERCIAL

## 2019-05-15 ENCOUNTER — APPOINTMENT (OUTPATIENT)
Dept: GENERAL RADIOLOGY | Facility: CLINIC | Age: 59
End: 2019-05-15
Attending: ORTHOPAEDIC SURGERY
Payer: COMMERCIAL

## 2019-05-15 VITALS
WEIGHT: 208 LBS | SYSTOLIC BLOOD PRESSURE: 133 MMHG | TEMPERATURE: 96.6 F | OXYGEN SATURATION: 98 % | DIASTOLIC BLOOD PRESSURE: 94 MMHG | RESPIRATION RATE: 16 BRPM | HEART RATE: 87 BPM | BODY MASS INDEX: 25.66 KG/M2

## 2019-05-15 DIAGNOSIS — Z98.890 S/P LUMBAR DISCECTOMY: Primary | ICD-10-CM

## 2019-05-15 PROCEDURE — 36000063 ZZH SURGERY LEVEL 4 EA 15 ADDTL MIN: Performed by: ORTHOPAEDIC SURGERY

## 2019-05-15 PROCEDURE — 25000128 H RX IP 250 OP 636: Performed by: PHYSICIAN ASSISTANT

## 2019-05-15 PROCEDURE — 27210794 ZZH OR GENERAL SUPPLY STERILE: Performed by: ORTHOPAEDIC SURGERY

## 2019-05-15 PROCEDURE — 40000277 XR SURGERY CARM FLUORO LESS THAN 5 MIN W STILLS

## 2019-05-15 PROCEDURE — 36000065 ZZH SURGERY LEVEL 4 W FLUORO 1ST 30 MIN: Performed by: ORTHOPAEDIC SURGERY

## 2019-05-15 PROCEDURE — 71000027 ZZH RECOVERY PHASE 2 EACH 15 MINS: Performed by: ORTHOPAEDIC SURGERY

## 2019-05-15 PROCEDURE — 71000012 ZZH RECOVERY PHASE 1 LEVEL 1 FIRST HR: Performed by: ORTHOPAEDIC SURGERY

## 2019-05-15 PROCEDURE — 25800030 ZZH RX IP 258 OP 636: Performed by: NURSE ANESTHETIST, CERTIFIED REGISTERED

## 2019-05-15 PROCEDURE — 25800030 ZZH RX IP 258 OP 636: Performed by: ANESTHESIOLOGY

## 2019-05-15 PROCEDURE — 37000008 ZZH ANESTHESIA TECHNICAL FEE, 1ST 30 MIN: Performed by: ORTHOPAEDIC SURGERY

## 2019-05-15 PROCEDURE — 25000125 ZZHC RX 250: Performed by: ORTHOPAEDIC SURGERY

## 2019-05-15 PROCEDURE — 25000132 ZZH RX MED GY IP 250 OP 250 PS 637: Performed by: PHYSICIAN ASSISTANT

## 2019-05-15 PROCEDURE — 40000170 ZZH STATISTIC PRE-PROCEDURE ASSESSMENT II: Performed by: ORTHOPAEDIC SURGERY

## 2019-05-15 PROCEDURE — 25000128 H RX IP 250 OP 636: Performed by: NURSE ANESTHETIST, CERTIFIED REGISTERED

## 2019-05-15 PROCEDURE — 37000009 ZZH ANESTHESIA TECHNICAL FEE, EACH ADDTL 15 MIN: Performed by: ORTHOPAEDIC SURGERY

## 2019-05-15 PROCEDURE — 25000128 H RX IP 250 OP 636: Performed by: ORTHOPAEDIC SURGERY

## 2019-05-15 PROCEDURE — 25000125 ZZHC RX 250: Performed by: NURSE ANESTHETIST, CERTIFIED REGISTERED

## 2019-05-15 RX ORDER — FENTANYL CITRATE 50 UG/ML
25-50 INJECTION, SOLUTION INTRAMUSCULAR; INTRAVENOUS
Status: DISCONTINUED | OUTPATIENT
Start: 2019-05-15 | End: 2019-05-15 | Stop reason: HOSPADM

## 2019-05-15 RX ORDER — GABAPENTIN 300 MG/1
300 CAPSULE ORAL
Status: COMPLETED | OUTPATIENT
Start: 2019-05-15 | End: 2019-05-15

## 2019-05-15 RX ORDER — PROPOFOL 10 MG/ML
INJECTION, EMULSION INTRAVENOUS CONTINUOUS PRN
Status: DISCONTINUED | OUTPATIENT
Start: 2019-05-15 | End: 2019-05-15

## 2019-05-15 RX ORDER — ACETAMINOPHEN 325 MG/1
975 TABLET ORAL ONCE
Status: COMPLETED | OUTPATIENT
Start: 2019-05-15 | End: 2019-05-15

## 2019-05-15 RX ORDER — LIDOCAINE HYDROCHLORIDE 20 MG/ML
INJECTION, SOLUTION INFILTRATION; PERINEURAL PRN
Status: DISCONTINUED | OUTPATIENT
Start: 2019-05-15 | End: 2019-05-15

## 2019-05-15 RX ORDER — FENTANYL CITRATE 50 UG/ML
INJECTION, SOLUTION INTRAMUSCULAR; INTRAVENOUS PRN
Status: DISCONTINUED | OUTPATIENT
Start: 2019-05-15 | End: 2019-05-15

## 2019-05-15 RX ORDER — HYDROMORPHONE HYDROCHLORIDE 1 MG/ML
.3-.5 INJECTION, SOLUTION INTRAMUSCULAR; INTRAVENOUS; SUBCUTANEOUS EVERY 5 MIN PRN
Status: DISCONTINUED | OUTPATIENT
Start: 2019-05-15 | End: 2019-05-15 | Stop reason: HOSPADM

## 2019-05-15 RX ORDER — DEXAMETHASONE SODIUM PHOSPHATE 4 MG/ML
INJECTION, SOLUTION INTRA-ARTICULAR; INTRALESIONAL; INTRAMUSCULAR; INTRAVENOUS; SOFT TISSUE PRN
Status: DISCONTINUED | OUTPATIENT
Start: 2019-05-15 | End: 2019-05-15

## 2019-05-15 RX ORDER — PROPOFOL 10 MG/ML
INJECTION, EMULSION INTRAVENOUS PRN
Status: DISCONTINUED | OUTPATIENT
Start: 2019-05-15 | End: 2019-05-15

## 2019-05-15 RX ORDER — ONDANSETRON 2 MG/ML
INJECTION INTRAMUSCULAR; INTRAVENOUS PRN
Status: DISCONTINUED | OUTPATIENT
Start: 2019-05-15 | End: 2019-05-15

## 2019-05-15 RX ORDER — BETAMETHASONE SODIUM PHOSPHATE AND BETAMETHASONE ACETATE 3; 3 MG/ML; MG/ML
INJECTION, SUSPENSION INTRA-ARTICULAR; INTRALESIONAL; INTRAMUSCULAR; SOFT TISSUE PRN
Status: DISCONTINUED | OUTPATIENT
Start: 2019-05-15 | End: 2019-05-15 | Stop reason: HOSPADM

## 2019-05-15 RX ORDER — ONDANSETRON 2 MG/ML
4 INJECTION INTRAMUSCULAR; INTRAVENOUS EVERY 30 MIN PRN
Status: DISCONTINUED | OUTPATIENT
Start: 2019-05-15 | End: 2019-05-15 | Stop reason: HOSPADM

## 2019-05-15 RX ORDER — OXYCODONE HYDROCHLORIDE 5 MG/1
5 TABLET ORAL EVERY 6 HOURS PRN
Qty: 30 TABLET | Refills: 0 | Status: SHIPPED | OUTPATIENT
Start: 2019-05-15 | End: 2019-08-06

## 2019-05-15 RX ORDER — CEFAZOLIN SODIUM 2 G/100ML
2 INJECTION, SOLUTION INTRAVENOUS
Status: COMPLETED | OUTPATIENT
Start: 2019-05-15 | End: 2019-05-15

## 2019-05-15 RX ORDER — SODIUM CHLORIDE, SODIUM LACTATE, POTASSIUM CHLORIDE, CALCIUM CHLORIDE 600; 310; 30; 20 MG/100ML; MG/100ML; MG/100ML; MG/100ML
INJECTION, SOLUTION INTRAVENOUS CONTINUOUS
Status: DISCONTINUED | OUTPATIENT
Start: 2019-05-15 | End: 2019-05-15 | Stop reason: HOSPADM

## 2019-05-15 RX ORDER — ONDANSETRON 4 MG/1
4 TABLET, ORALLY DISINTEGRATING ORAL EVERY 30 MIN PRN
Status: DISCONTINUED | OUTPATIENT
Start: 2019-05-15 | End: 2019-05-15 | Stop reason: HOSPADM

## 2019-05-15 RX ORDER — SODIUM CHLORIDE, SODIUM LACTATE, POTASSIUM CHLORIDE, CALCIUM CHLORIDE 600; 310; 30; 20 MG/100ML; MG/100ML; MG/100ML; MG/100ML
INJECTION, SOLUTION INTRAVENOUS CONTINUOUS PRN
Status: DISCONTINUED | OUTPATIENT
Start: 2019-05-15 | End: 2019-05-15

## 2019-05-15 RX ORDER — CEFAZOLIN SODIUM 1 G/3ML
1 INJECTION, POWDER, FOR SOLUTION INTRAMUSCULAR; INTRAVENOUS SEE ADMIN INSTRUCTIONS
Status: DISCONTINUED | OUTPATIENT
Start: 2019-05-15 | End: 2019-05-15 | Stop reason: HOSPADM

## 2019-05-15 RX ORDER — NALOXONE HYDROCHLORIDE 0.4 MG/ML
.1-.4 INJECTION, SOLUTION INTRAMUSCULAR; INTRAVENOUS; SUBCUTANEOUS
Status: DISCONTINUED | OUTPATIENT
Start: 2019-05-15 | End: 2019-05-15 | Stop reason: HOSPADM

## 2019-05-15 RX ORDER — KETOROLAC TROMETHAMINE 30 MG/ML
30 INJECTION, SOLUTION INTRAMUSCULAR; INTRAVENOUS ONCE
Status: COMPLETED | OUTPATIENT
Start: 2019-05-15 | End: 2019-05-15

## 2019-05-15 RX ADMIN — CEFAZOLIN SODIUM 2 G: 2 INJECTION, SOLUTION INTRAVENOUS at 13:12

## 2019-05-15 RX ADMIN — DEXAMETHASONE SODIUM PHOSPHATE 4 MG: 4 INJECTION, SOLUTION INTRA-ARTICULAR; INTRALESIONAL; INTRAMUSCULAR; INTRAVENOUS; SOFT TISSUE at 13:21

## 2019-05-15 RX ADMIN — ACETAMINOPHEN 975 MG: 325 TABLET, FILM COATED ORAL at 11:26

## 2019-05-15 RX ADMIN — PROPOFOL 20 MG: 10 INJECTION, EMULSION INTRAVENOUS at 13:06

## 2019-05-15 RX ADMIN — FENTANYL CITRATE 50 MCG: 50 INJECTION, SOLUTION INTRAMUSCULAR; INTRAVENOUS at 13:08

## 2019-05-15 RX ADMIN — ONDANSETRON 4 MG: 2 INJECTION INTRAMUSCULAR; INTRAVENOUS at 14:10

## 2019-05-15 RX ADMIN — GABAPENTIN 300 MG: 300 CAPSULE ORAL at 11:26

## 2019-05-15 RX ADMIN — KETOROLAC TROMETHAMINE 30 MG: 30 INJECTION, SOLUTION INTRAMUSCULAR at 14:45

## 2019-05-15 RX ADMIN — LIDOCAINE HYDROCHLORIDE 80 MG: 20 INJECTION, SOLUTION INFILTRATION; PERINEURAL at 13:42

## 2019-05-15 RX ADMIN — MIDAZOLAM 2 MG: 1 INJECTION INTRAMUSCULAR; INTRAVENOUS at 13:01

## 2019-05-15 RX ADMIN — SODIUM CHLORIDE, POTASSIUM CHLORIDE, SODIUM LACTATE AND CALCIUM CHLORIDE: 600; 310; 30; 20 INJECTION, SOLUTION INTRAVENOUS at 13:00

## 2019-05-15 RX ADMIN — PROPOFOL 150 MCG/KG/MIN: 10 INJECTION, EMULSION INTRAVENOUS at 13:06

## 2019-05-15 RX ADMIN — DEXMEDETOMIDINE HYDROCHLORIDE 12 MCG: 100 INJECTION, SOLUTION INTRAVENOUS at 13:42

## 2019-05-15 RX ADMIN — SODIUM CHLORIDE, POTASSIUM CHLORIDE, SODIUM LACTATE AND CALCIUM CHLORIDE: 600; 310; 30; 20 INJECTION, SOLUTION INTRAVENOUS at 15:16

## 2019-05-15 ASSESSMENT — LIFESTYLE VARIABLES: TOBACCO_USE: 0

## 2019-05-15 NOTE — BRIEF OP NOTE
[facility]  Spinal Surgery Brief Operative Note    Pre-operative diagnosis: LEFT L5 S1 DISC HERNIATION WITH LEFT S1 RIDICULOPATHY ; AND LEFT GASTROC SOLIUS MUSCLE WEAKNESS   Post-operative diagnosis: Same   Procedure: LEFT L5-S1 DISCECTOMY   Surgeon: SUPRIYA ORTEGA MD   Assistant(s): JONAH MONK PA-C   Anesthesia: General endotracheal anesthesia and MAC   Estimated blood loss: 10 ml   Total IV fluids: (See anesthesia record)   Blood transfusion: NONE   Drains: None   Specimens: NONE   Implants: AS ABOVE   Findings: AS ABOVE   Complications: NONE   Condition: STABLE   Comments: SEE DICTATED OPERATIVE REPORT FOR DETAILS

## 2019-05-15 NOTE — DISCHARGE INSTRUCTIONS
Today you received Toradol, an antiinflammatory medication similar to Ibuprofen.  You should not take other antiinflammatory medication, such as Ibuprofen, Motrin, Advil, Aleve, Naprosyn, etc until 8:45 pm.     Today you were given 975 mg of Tylenol at 11:30 am. The recommended daily maximum dose is 4000 mg.   Same Day Surgery Discharge Instructions for  Sedation and General Anesthesia       It's not unusual to feel dizzy, light-headed or faint for up to 24 hours after surgery or while taking pain medication.  If you have these symptoms: sit for a few minutes before standing and have someone assist you when you get up to walk or use the bathroom.      You should rest and relax for the next 24 hours. We recommend you make arrangements to have an adult stay with you for at least 24 hours after your discharge.  Avoid hazardous and strenuous activity.      DO NOT DRIVE any vehicle or operate mechanical equipment for 24 hours following the end of your surgery.  Even though you may feel normal, your reactions may be affected by the medication you have received.      Do not drink alcoholic beverages for 24 hours following surgery.       Slowly progress to your regular diet as you feel able. It's not unusual to feel nauseated and/or vomit after receiving anesthesia.  If you develop these symptoms, drink clear liquids (apple juice, ginger ale, broth, 7-up, etc. ) until you feel better.  If your nausea and vomiting persists for 24 hours, please notify your surgeon.        All narcotic pain medications, along with inactivity and anesthesia, can cause constipation. Drinking plenty of liquids and increasing fiber intake will help.      For any questions of a medical nature, call your surgeon.      Do not make important decisions for 24 hours.      If you had general anesthesia, you may have a sore throat for a couple of days related to the breathing tube used during surgery.  You may use Cepacol lozenges to help with this  discomfort.  If it worsens or if you develop a fever, contact your surgeon.       If you feel your pain is not well managed with the pain medications prescribed by your surgeon, please contact your surgeon's office to let them know so they can address your concerns.       [facility]   Post-Operative Laminectomy/Discectomy Instructions  Dr. Angel Vera    As you are getting ready to leave the hospital following your surgery, you will have many questions regarding your follow-up and after hospital care.  Dr. Vera will talk to you about many of these questions in the hospital but it is also important for you to have your instructions written down so that you can refer to them when you get home.  These are general guidelines that apply to patients that had your type of surgery.      If you were not given a post-operative follow up appointment then please call Dr. Vera's office at  [phone] for a follow-up appointment for 10-14 days after the date of your surgery.      After surgery you may notice some continued leg or back pain similar to the symptoms you had prior to surgery.  This is normal and usually is related to the amount of pressure and the length of time that the nerves were pinched.  Sometimes the pain that you felt in your leg is replaced with some degree of numbness that gradually fades over a period of days or weeks.      Certain postures and activities can cause an increase in back or leg pain and may even put you at risk for recurrence.  Avoid any twisting or bending.  Do not lift anything that weighs more than 15 pounds.  When picking things up from the floor, be sure to bend at the knees.  When moving about, it is best to try to keep your back straight and well supported.      Try to restrict your sitting to a minimum for the first 1-2 days for periods less than 20-30 min at a time as sitting places an increased load on the intervertebral discs. Gradually increase sitting, as  comfort allows over the first week post-op.  When you do sit, try to use a chair that provides adequate support for your back.  When riding in a car, you may want to recline the seat to lessen the load on the intervertebral disc.      Walking is an excellent post-operative exercise.  You are encouraged to walk several times per day for at least five to ten minutes at a time.  Walking for longer periods is all right if you do not develop a pain in either your back or legs.  Let your comfort be your guide in helping you set limits.  Do not participate in any sports activities.      You may shower on the third day after your surgery, if your incision is clean and dry.  You may shower earlier if you have a plastic dressing (Tegaderm) to cover your incision.  Do not soak in a tub until you are seen in the office for your follow-up appointment with Dr. Vera.      The small pieces of tape over your incision are called Steri-Strips. They can be removed if they become lose; other-wise leave steri-strips on for 14 days.  Your stitches are the kind that dissolves under the skin.  They do not have to be removed.      Occasionally, non-absorbable sutures (black nylon) need to be used.  This is not covered by Steri-Strips.  These sutures should be covered with a waterproof barrier when showering (Tegaderm or foam tape), and will be removed in the office at your follow-up appointment.      If you go home the day of surgery, you should put on a fresh dressing the following day.  If you stay overnight in the hospital, the nurse will check your incision and change the dressing before you are discharged.  Change your dressing daily for 10-14 days.        If you had compression stockings (TEDS) on in the hospital you do not need to wear these after you are discharged from the hospital.      You should not drive until you are no longer taking narcotic pain medication.      After your surgery, you may begin to engage in sexual activity  as comfort allows.  For the first four weeks it is recommended that you participate as a passive partner.      Before returning to work, please consult Dr. Vera regarding work limitations or restrictions. Your ability to return to work will depend on the type of work that you do and the type of surgery you had.  Please let Dr. Vera know if you need any written information for your employer.      Infection following disc surgery is rare.  Signs of infection include: drainage from your incision, increasing redness at the margins of your wound, fevers (greater than 101   Fahrenheit), chills, rapidly increasing back pain.  If you have any of these symptoms, please call the office and either Dr. Vera or one of the nurses can discuss this with you.  Other reasons to call the office would include difficulty passing your urine or trouble controlling your bowels.      If you have any questions or concerns not covered in these instructions, please feel free to call Dr. Vera's office at [phone].  Someone is always available to answer your questions.

## 2019-05-15 NOTE — ANESTHESIA PREPROCEDURE EVALUATION
Anesthesia Pre-Procedure Evaluation    Patient: Wyatt Dhillon   MRN: [id number] : 1960          Preoperative Diagnosis: LEFT L5 S1 DISC HERNIATION WITH LEFT S1 RIDICULOPATHY ; AND LEFT GASTROC SOLIUS MUSCLE WEAKNESS    Procedure(s):  LEFT L 5  S1 DISCECTOMY ( SHEEHAN FRAME ; ERLINDA )  (MAC ANESTHESIA)    Past Medical History:   Diagnosis Date     Anal fistula     s/p repair     Carpal tunnel syndrome on both sides      Cervical disc herniation 1986    C5-6     Elevated blood sugar      Gastroenteritis     while in [place]     Hx of colonoscopy     nl     Insufficient sleep syndrome     sleep test done     Normal stress echocardiogram      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    one year of meds     Past Surgical History:   Procedure Laterality Date     C REPAIR  ANAL FISTULA,RECT ADV FLAP       COLONOSCOPY N/A 2019    Procedure: COLONOSCOPY;  Surgeon: Fco Cross MD;  Location:  GI       Anesthesia Evaluation     . Pt has had prior anesthetic. Type: General    No history of anesthetic complications          ROS/MED HX    ENT/Pulmonary:      (-) tobacco use   Neurologic:       Cardiovascular:     (+) ----. : . . . :. . Previous cardiac testing date:results:date: results:ECG reviewed date:19 results:NSR date: results:          METS/Exercise Tolerance:  >4 METS   Hematologic:         Musculoskeletal:   (+)  other musculoskeletal- Cervical disc herniation (C5-6)      GI/Hepatic:         Renal/Genitourinary:         Endo:         Psychiatric:         Infectious Disease:         Malignancy:         Other:                          Physical Exam  Normal systems: cardiovascular, pulmonary and dental    Airway   Mallampati: I  TM distance: >3 FB  Neck ROM: full    Dental     Cardiovascular       Pulmonary             Lab Results   Component Value Date    WBC 4.7 2019    HGB 14.6 2019    HCT 42.0 2019     2019     2019    POTASSIUM 4.7 2019     "CHLORIDE 105 05/13/2019    CO2 31 05/13/2019    BUN 22 05/13/2019    CR 0.97 05/13/2019     (H) 05/13/2019    BARRON 9.2 05/13/2019    ALBUMIN 4.1 03/22/2018    PROTTOTAL 7.6 03/22/2018    ALT 59 03/22/2018    AST 39 03/22/2018    ALKPHOS 78 03/22/2018    BILITOTAL 0.7 03/22/2018       Preop Vitals  BP Readings from Last 3 Encounters:   05/13/19 (!) 142/92   01/07/19 117/80   10/11/18 138/82    Pulse Readings from Last 3 Encounters:   05/13/19 79   01/07/19 67   10/11/18 81      Resp Readings from Last 3 Encounters:   01/07/19 (!) 7   10/11/18 16    SpO2 Readings from Last 3 Encounters:   05/13/19 97%   01/07/19 98%   10/11/18 99%      Temp Readings from Last 1 Encounters:   05/13/19 36.2  C (97.2  F) (Oral)    Ht Readings from Last 1 Encounters:   05/13/19 1.918 m (6' 3.5\")      Wt Readings from Last 1 Encounters:   05/13/19 96.6 kg (213 lb)    Estimated body mass index is 26.27 kg/m  as calculated from the following:    Height as of 5/13/19: 1.918 m (6' 3.5\").    Weight as of 5/13/19: 96.6 kg (213 lb).       Anesthesia Plan      History & Physical Review  History and physical reviewed and following examination; no interval change.    ASA Status:  2 .    NPO Status:  > 8 hours    Plan for MAC Reason for MAC:  Deep or markedly invasive procedure (G8)  PONV prophylaxis:  Ondansetron (or other 5HT-3)       Postoperative Care  Postoperative pain management:  IV analgesics, Oral pain medications and Multi-modal analgesia.      Consents  Anesthetic plan, risks, benefits and alternatives discussed with:  Patient..                 Venu Calloway MD  "

## 2019-05-15 NOTE — ANESTHESIA CARE TRANSFER NOTE
Patient: Wyatt Dhillon    Procedure(s):  LEFT L 5  S1 DISCECTOMY ( SHEEHAN FRAME ; ERLINDA )  (MAC ANESTHESIA)    Diagnosis: LEFT L5 S1 DISC HERNIATION WITH LEFT S1 RIDICULOPATHY ; AND LEFT GASTROC SOLIUS MUSCLE WEAKNESS  Diagnosis Additional Information: No value filed.    Anesthesia Type:   MAC     Note:  Airway :Face Mask  Patient transferred to:PACU  Comments: At end of procedure, spontaneous respirations, patient alert to voice, able to follow commands. Oxygen via facemask at 10 liters per minute to PACU. Oxygen tubing connected to wall O2 in PACU, SpO2, NiBP, and EKG monitors and alarms on and functioning, report on patient's clinical status given to PACU RN, RN questions answered.Handoff Report: Identifed the Patient, Identified the Reponsible Provider, Reviewed the pertinent medical history, Discussed the surgical course, Reviewed Intra-OP anesthesia mangement and issues during anesthesia, Set expectations for post-procedure period and Allowed opportunity for questions and acknowledgement of understanding      Vitals: (Last set prior to Anesthesia Care Transfer)    CRNA VITALS  5/15/2019 1350 - 5/15/2019 1426      5/15/2019             Resp Rate (set):  10                Electronically Signed By: MANPREET Garcia CRNA  May 15, 2019  2:26 PM

## 2019-05-15 NOTE — ANESTHESIA POSTPROCEDURE EVALUATION
Patient: Wyatt Dhillon    Procedure(s):  LEFT L 5  S1 DISCECTOMY ( SHEEHAN FRAME ; ERLINDA )  (MAC ANESTHESIA)    Diagnosis:LEFT L5 S1 DISC HERNIATION WITH LEFT S1 RIDICULOPATHY ; AND LEFT GASTROC SOLIUS MUSCLE WEAKNESS  Diagnosis Additional Information: No value filed.    Anesthesia Type:  MAC    Note:  Anesthesia Post Evaluation    Patient location during evaluation: PACU  Patient participation: Able to fully participate in evaluation  Level of consciousness: awake  Pain management: adequate  Airway patency: patent  Cardiovascular status: acceptable  Respiratory status: acceptable  Hydration status: acceptable  PONV: none             Last vitals:  Vitals:    05/15/19 1105 05/15/19 1422 05/15/19 1430   BP: (!) 136/91 113/73 112/78   Pulse:  96 91   Resp: 16 22 25   Temp: 36.6  C (97.8  F) 35.8  C (96.4  F) 35.6  C (96.1  F)   SpO2: 98% 97% 100%         Electronically Signed By: Venu Calloway MD  May 15, 2019  2:39 PM

## 2019-05-16 NOTE — OP NOTE
Procedure Date: 05/15/2019      PREOPERATIVE DIAGNOSIS:  Left L5-S1 disc herniation with left leg radiculopathy.      POSTOPERATIVE DIAGNOSIS:  Left L5-S1 disc herniation with left leg radiculopathy.      PROCEDURE:  Left L5-S1 discectomy.      SURGEON:  Angel Vera MD      ASSISTANT:  RESHMA Santana.      ANESTHESIA:  MAC.      OPERATIVE DESCRIPTION:  The patient was brought to the operating room.  He helped position himself on the Trotter frame.  When he was carefully padded and positioned, he was given some IV sedation.  His back was marked with a marking pen over the planned skin incision and then prepped in a routine sterile fashion.  When he was comfortable and relaxed, the planned skin incision was injected with 10 mL of 0.5% Marcaine and 1% lidocaine mixture, preservative-free.  A marking needle was placed at what was felt to be the L5-S1 level.  A lateral x-ray was obtained and this confirmed it was over the L5-S1 level.  The needle was removed and his back was draped in a routine sterile fashion.      After the timeout, a midline skin incision was made over the L5-S1 level.  A left side exposure was performed at L5-S1 and a Alejo retractor was placed.  A Penfield was placed over the ligamentum flavum and another lateral x-ray was obtained.  This again confirmed the L5-S1 level.      Ligamentum flavum was removed between L5 and S1.  He had a relatively thick ligamentum flavum.  He also had some epidural veins covering his left S1 nerve root.  The veins were cauterized and then removed.  The S1 nerve root was identified.  A small laminotomy was required of S1, removing a few millimeters of the superior lamina of S1 and a little bit of the lamina of L5, just a couple of millimeters.  The ligamentum flavum was removed all the way out to the lateral edge of the spinal canal.  The S1 nerve root was then mobilized medially.  The disc herniation was directly underneath the nerve root.  There was a soft  inflammatory covering over the extruded fragment.  This was incised with a 15 blade.  Disc material extruded.  Then fragment was retrieved with a nerve hook and pulled out.  There was an additional free fragment that was out somewhat further laterally, which was also squeezed out with a nerve hook.  I was then able to reach into the hole in the disc.  It was a relatively small hole.  I could place a Penfield into the hole and then I was able to trace the hole with the [place] pituitary and clean the hole out.  I did not retrieve any additional significant fragments.  Straight and upbiting [place] pituitaries were tried.      At this point then, we had a good decompression and the disc was removed.  I trimmed the inflammatory covering slightly with a 2 mm Kerrison.  The nerve was allowed to fall back into its normal position.  Final irrigation was performed.  Additional Marcaine was used in skin, fascia and muscle.  Celestone and Gelfoam were placed over the exposed nerve root and the wound was closed in routine fashion with interrupted #1 Vicryl suture for the fascia, 2-0 for the subcutaneous and 3-0 Vicryl for the skin.  Dressings were applied, drapes removed.  Sedation was turned off.  He was transferred to the hospital cart and taken to the recovery room in good condition.      ESTIMATED BLOOD LOSS:  10 mL.      COMPLICATIONS:  None.      DRAINS:  None.         SUPRIYA ORTEGA MD             D: 05/15/2019   T: 05/15/2019   MT: CHRISTOPHER      Name:     MARIAM RIVERA   MRN:      [id number]        Account:        [id number]   :      1960           Procedure Date: 05/15/2019      Document: [id number]       cc: Zach Loco MD

## 2019-05-30 ENCOUNTER — TRANSFERRED RECORDS (OUTPATIENT)
Dept: HEALTH INFORMATION MANAGEMENT | Facility: CLINIC | Age: 59
End: 2019-05-30

## 2019-07-25 ENCOUNTER — TRANSFERRED RECORDS (OUTPATIENT)
Dept: HEALTH INFORMATION MANAGEMENT | Facility: CLINIC | Age: 59
End: 2019-07-25

## 2019-08-06 ENCOUNTER — OFFICE VISIT (OUTPATIENT)
Dept: FAMILY MEDICINE | Facility: CLINIC | Age: 59
End: 2019-08-06
Payer: COMMERCIAL

## 2019-08-06 VITALS
WEIGHT: 214 LBS | TEMPERATURE: 98 F | HEART RATE: 76 BPM | BODY MASS INDEX: 26.06 KG/M2 | SYSTOLIC BLOOD PRESSURE: 136 MMHG | DIASTOLIC BLOOD PRESSURE: 85 MMHG | HEIGHT: 76 IN | OXYGEN SATURATION: 97 %

## 2019-08-06 DIAGNOSIS — Z00.00 ROUTINE GENERAL MEDICAL EXAMINATION AT A HEALTH CARE FACILITY: Primary | ICD-10-CM

## 2019-08-06 DIAGNOSIS — R73.9 ELEVATED BLOOD SUGAR: ICD-10-CM

## 2019-08-06 LAB
ALBUMIN SERPL-MCNC: 3.8 G/DL (ref 3.4–5)
ALP SERPL-CCNC: 76 U/L (ref 40–150)
ALT SERPL W P-5'-P-CCNC: 37 U/L (ref 0–70)
ANION GAP SERPL CALCULATED.3IONS-SCNC: 6 MMOL/L (ref 3–14)
AST SERPL W P-5'-P-CCNC: 26 U/L (ref 0–45)
BILIRUB SERPL-MCNC: 0.7 MG/DL (ref 0.2–1.3)
BUN SERPL-MCNC: 16 MG/DL (ref 7–30)
CALCIUM SERPL-MCNC: 9 MG/DL (ref 8.5–10.1)
CHLORIDE SERPL-SCNC: 108 MMOL/L (ref 94–109)
CHOLEST SERPL-MCNC: 189 MG/DL
CO2 SERPL-SCNC: 27 MMOL/L (ref 20–32)
CREAT SERPL-MCNC: 0.94 MG/DL (ref 0.66–1.25)
ERYTHROCYTE [DISTWIDTH] IN BLOOD BY AUTOMATED COUNT: 12.9 % (ref 10–15)
GFR SERPL CREATININE-BSD FRML MDRD: 87 ML/MIN/{1.73_M2}
GLUCOSE SERPL-MCNC: 112 MG/DL (ref 70–99)
HBA1C MFR BLD: 5.5 % (ref 0–5.6)
HCT VFR BLD AUTO: 41.5 % (ref 40–53)
HDLC SERPL-MCNC: 62 MG/DL
HGB BLD-MCNC: 14.1 G/DL (ref 13.3–17.7)
HIV 1+2 AB+HIV1 P24 AG SERPL QL IA: NONREACTIVE
LDLC SERPL CALC-MCNC: 89 MG/DL
MCH RBC QN AUTO: 32.6 PG (ref 26.5–33)
MCHC RBC AUTO-ENTMCNC: 34 G/DL (ref 31.5–36.5)
MCV RBC AUTO: 96 FL (ref 78–100)
NONHDLC SERPL-MCNC: 127 MG/DL
PLATELET # BLD AUTO: 184 10E9/L (ref 150–450)
POTASSIUM SERPL-SCNC: 4.2 MMOL/L (ref 3.4–5.3)
PROT SERPL-MCNC: 7.2 G/DL (ref 6.8–8.8)
PSA SERPL-ACNC: 1.34 UG/L (ref 0–4)
RBC # BLD AUTO: 4.32 10E12/L (ref 4.4–5.9)
SODIUM SERPL-SCNC: 141 MMOL/L (ref 133–144)
TRIGL SERPL-MCNC: 191 MG/DL
WBC # BLD AUTO: 4.6 10E9/L (ref 4–11)

## 2019-08-06 PROCEDURE — 83036 HEMOGLOBIN GLYCOSYLATED A1C: CPT | Performed by: INTERNAL MEDICINE

## 2019-08-06 PROCEDURE — 99396 PREV VISIT EST AGE 40-64: CPT | Performed by: INTERNAL MEDICINE

## 2019-08-06 PROCEDURE — 36415 COLL VENOUS BLD VENIPUNCTURE: CPT | Performed by: INTERNAL MEDICINE

## 2019-08-06 PROCEDURE — 85027 COMPLETE CBC AUTOMATED: CPT | Performed by: INTERNAL MEDICINE

## 2019-08-06 PROCEDURE — 87389 HIV-1 AG W/HIV-1&-2 AB AG IA: CPT | Performed by: INTERNAL MEDICINE

## 2019-08-06 PROCEDURE — G0103 PSA SCREENING: HCPCS | Performed by: INTERNAL MEDICINE

## 2019-08-06 PROCEDURE — 80061 LIPID PANEL: CPT | Performed by: INTERNAL MEDICINE

## 2019-08-06 PROCEDURE — 80053 COMPREHEN METABOLIC PANEL: CPT | Performed by: INTERNAL MEDICINE

## 2019-08-06 ASSESSMENT — MIFFLIN-ST. JEOR: SCORE: 1869.26

## 2019-08-06 NOTE — LETTER
[facility]  [address] [facility]  [address]  [place]  [address]  Tel: [phone]    August 8, 2019    Wyatt JAXON Dhillon  [address] [facility] [address]        Dear Mr. Dhillon,    I am happy to report that your cbc or complete blood count is normal with no signs of anemia, leukemia or platelet abnormalities.  Your chemistry panel shows a slightly elevated blood glucose but a normal hemoglobin A1c.  The latter test looks back over the last 3 months to give us an average reading so it is better than the glucose test itself.  The bottom line is that you are at higher risk of developing diabetes.  The best way to lower the risk is through regular exercise, a healthy diet and keeping your weight down.  Your blood salts, kidney tests, liver tests, and proteins are all fine.    Your total cholesterol is 189 with the normal range being below 200.  Your HDL or good cholesterol is 62 with the normal range being above 40.  Your LDL or bad cholesterol is 89 with the normal range being below 130.  These numbers are very good.      A couple of additional normal results include the PSA test and your HIV test.    If you have any further questions or problems, please contact our office.      Sincerely,    Wyatt Loco MD          Enclosure: Lab Results    Results for orders placed or performed in visit on 08/06/19   CBC with platelets   Result Value Ref Range    WBC 4.6 4.0 - 11.0 10e9/L    RBC Count 4.32 (L) 4.4 - 5.9 10e12/L    Hemoglobin 14.1 13.3 - 17.7 g/dL    Hematocrit 41.5 40.0 - 53.0 %    MCV 96 78 - 100 fl    MCH 32.6 26.5 - 33.0 pg    MCHC 34.0 31.5 - 36.5 g/dL    RDW 12.9 10.0 - 15.0 %    Platelet Count 184 150 - 450 10e9/L   Comprehensive metabolic panel   Result Value Ref Range    Sodium 141 133 - 144 mmol/L    Potassium 4.2 3.4 - 5.3 mmol/L    Chloride 108 94 - 109 mmol/L    Carbon Dioxide 27 20 - 32 mmol/L    Anion Gap 6 3 - 14 mmol/L    Glucose 112 (H) 70 - 99 mg/dL    Urea Nitrogen  16 7 - 30 mg/dL    Creatinine 0.94 0.66 - 1.25 mg/dL    GFR Estimate 87 >60 mL/min/[1.73_m2]    GFR Estimate If Black >90 >60 mL/min/[1.73_m2]    Calcium 9.0 8.5 - 10.1 mg/dL    Bilirubin Total 0.7 0.2 - 1.3 mg/dL    Albumin 3.8 3.4 - 5.0 g/dL    Protein Total 7.2 6.8 - 8.8 g/dL    Alkaline Phosphatase 76 40 - 150 U/L    ALT 37 0 - 70 U/L    AST 26 0 - 45 U/L   Lipid panel reflex to direct LDL Non-fasting   Result Value Ref Range    Cholesterol 189 <200 mg/dL    Triglycerides 191 (H) <150 mg/dL    HDL Cholesterol 62 >39 mg/dL    LDL Cholesterol Calculated 89 <100 mg/dL    Non HDL Cholesterol 127 <130 mg/dL   HIV Antigen Antibody Combo   Result Value Ref Range    HIV Antigen Antibody Combo Nonreactive NR^Nonreactive       Hemoglobin A1c   Result Value Ref Range    Hemoglobin A1C 5.5 0 - 5.6 %   Prostate spec antigen screen   Result Value Ref Range    PSA 1.34 0 - 4 ug/L

## 2019-08-06 NOTE — PROGRESS NOTES
SUBJECTIVE:   CC: Wyatt Dhillon is an 59 year old male who presents for preventative health visit.     The patient is doing fine, hurt back and some ache related to that, but had surgery.  Works out some.  No other c/o.    , owns clothing store.    Healthy Habits:     Getting at least 3 servings of Calcium per day:  Yes    Bi-annual eye exam:  Yes    Dental care twice a year:  Yes    Sleep apnea or symptoms of sleep apnea:  Daytime drowsiness    Diet:  Regular (no restrictions)    Frequency of exercise:  2-3 days/week    Duration of exercise:  45-60 minutes    Taking medications regularly:  Yes    Barriers to taking medications:  Not applicable    Medication side effects:  Not applicable    PHQ-2 Total Score: 0    Additional concerns today:  No              Today's PHQ-2 Score:   PHQ-2 ( 1999 Pfizer) 5/13/2019   Q1: Little interest or pleasure in doing things 0   Q2: Feeling down, depressed or hopeless 0   PHQ-2 Score 0       Abuse: Current or Past(Physical, Sexual or Emotional)- no  Do you feel safe in your environment? Yes    Social History     Tobacco Use     Smoking status: Never Smoker     Smokeless tobacco: Never Used   Substance Use Topics     Alcohol use: Yes     Alcohol/week: 4.2 oz     Types: 7 Standard drinks or equivalent per week     Comment: drinks about 4 days/wk, 7 glasses of wine per week     If you drink alcohol do you typically have >3 drinks per day or >7 drinks per week? No               Past Medical History:      Past Medical History:   Diagnosis Date     Anal fistula 2001    s/p repair     Carpal tunnel syndrome on both sides 2012     Cervical disc herniation 1986    C5-6     Elevated blood sugar      Gastroenteritis     while in [place]     Hx of colonoscopy 2008, 2019    nl     Insufficient sleep syndrome 2012    sleep test done     Normal stress echocardiogram 2002     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    one year of meds             Past Surgical History:      Past Surgical History:   Procedure  Laterality Date     C REPAIR  ANAL FISTULA,RECT ADV FLAP  2001     COLONOSCOPY N/A 1/7/2019    Procedure: COLONOSCOPY;  Surgeon: Fco Cross MD;  Location:  GI     LAMINECTOMY LUMBAR ONE LEVEL Left 5/15/2019    Procedure: LEFT L 5  S1 DISCECTOMY ( SHEEHAN FRAME ; ERLINDA )  (MAC ANESTHESIA);  Surgeon: Angel Vera MD;  Location:  OR             Social History:     Social History     Socioeconomic History     Marital status:      Spouse name: Not on file     Number of children: 4     Years of education: Not on file     Highest education level: Not on file   Occupational History     Not on file   Social Needs     Financial resource strain: Not on file     Food insecurity:     Worry: Not on file     Inability: Not on file     Transportation needs:     Medical: Not on file     Non-medical: Not on file   Tobacco Use     Smoking status: Never Smoker     Smokeless tobacco: Never Used   Substance and Sexual Activity     Alcohol use: Yes     Alcohol/week: 4.2 oz     Types: 7 Standard drinks or equivalent per week     Comment: 8     Drug use: No     Sexual activity: Yes     Partners: Female   Lifestyle     Physical activity:     Days per week: Not on file     Minutes per session: Not on file     Stress: Not on file   Relationships     Social connections:     Talks on phone: Not on file     Gets together: Not on file     Attends Congregation service: Not on file     Active member of club or organization: Not on file     Attends meetings of clubs or organizations: Not on file     Relationship status: Not on file     Intimate partner violence:     Fear of current or ex partner: Not on file     Emotionally abused: Not on file     Physically abused: Not on file     Forced sexual activity: Not on file   Other Topics Concern     Parent/sibling w/ CABG, MI or angioplasty before 65F 55M? Not Asked   Social History Narrative     Not on file             Family History:   reviewed         Allergies:     Allergies  "  Allergen Reactions     Codeine Camsylate Rash             Medications:     Current Outpatient Medications   Medication Sig Dispense Refill     diphenhydrAMINE (BENADRYL ALLERGY) 25 MG tablet Take 25 mg by mouth every 6 hours as needed.                 Review of Systems:   The 10 point Review of Systems is negative other than noted in the HPI           Physical Exam:   Blood pressure 136/85, pulse 76, temperature 98  F (36.7  C), temperature source Oral, height 1.918 m (6' 3.5\"), weight 97.1 kg (214 lb), SpO2 97 %.    Exam:  Constitutional: healthy appearing, alert and in no distress  Heent: Normocephalic. Head without obvious masses or lesions. PERRLDC, EOMI. Mouth exam within normal limits: tongue, mucous membranes, posterior pharynx all normal, no lesions or abnormalities seen.  Tm's and canals within normal limits bilaterally. Neck supple, no nuchal rigidity or masses. No supraclavicular, or cervical adenopathy. Thyroid symmetric, no masses.  Cardiovascular: Regular rate and rhythm, no murmer, rub or gallops.  JVP not elevated, no edema.  Carotids within normal limits bilaterally, no bruits.  Respiratory: Normal respiratory effort.  Lungs clear, normal flow, no wheezing or crackles.  Breasts: Normal bilaterally.  No masses or lesions.  Nipples within normal limites.  No axillary lesions or nodes.  Gastrointestinal: Normal active bowel sounds.   Soft, not tender, no masses, guarding or rebound.  No hepatosplenomegaly.   Genitourinary: Rectal min bph  Musculoskeletal: extremities normal, no gross deformities noted.  Skin: no suspicious lesions or rashes   Neurologic: Mental status within normal limits.  Speech fluent.  No gross motor abnormalities and gait intact.  Psychiatric: mentation appears normal and affect normal.         Data:   Labs sent        Assessment:   1. Normal complete physical exam  2. Elevated sugar, follow up labs  3. hcm         Plan:   shingrix at pharm  Exercise, diet and weight " loss  Letter with labs      Wyatt Loco M.D.

## 2019-09-13 ENCOUNTER — OFFICE VISIT (OUTPATIENT)
Dept: URGENT CARE | Facility: URGENT CARE | Age: 59
End: 2019-09-13
Payer: COMMERCIAL

## 2019-09-13 VITALS
TEMPERATURE: 98 F | OXYGEN SATURATION: 100 % | DIASTOLIC BLOOD PRESSURE: 86 MMHG | SYSTOLIC BLOOD PRESSURE: 142 MMHG | BODY MASS INDEX: 26.64 KG/M2 | WEIGHT: 216 LBS | HEART RATE: 76 BPM

## 2019-09-13 DIAGNOSIS — S70.211A ABRASION OF RIGHT HIP, INITIAL ENCOUNTER: Primary | ICD-10-CM

## 2019-09-13 PROCEDURE — 99213 OFFICE O/P EST LOW 20 MIN: CPT

## 2019-09-13 RX ORDER — IBUPROFEN 100 MG/5ML
10 SUSPENSION, ORAL (FINAL DOSE FORM) ORAL EVERY 6 HOURS PRN
COMMUNITY
End: 2019-10-21

## 2019-09-13 RX ORDER — CEPHALEXIN 500 MG/1
500 CAPSULE ORAL 3 TIMES DAILY
Qty: 15 CAPSULE | Refills: 0 | Status: SHIPPED | OUTPATIENT
Start: 2019-09-13 | End: 2019-09-24

## 2019-09-13 ASSESSMENT — ENCOUNTER SYMPTOMS
FEVER: 0
RESPIRATORY NEGATIVE: 1
FOCAL WEAKNESS: 0
MUSCULOSKELETAL NEGATIVE: 1
NEUROLOGICAL NEGATIVE: 1
CARDIOVASCULAR NEGATIVE: 1

## 2019-09-13 NOTE — PATIENT INSTRUCTIONS
Patient Education     Abrasions  Abrasions are skin scrapes. Their treatment depends on how large and deep the abrasion is.  Home care  You may be prescribed an antibiotic cream or ointment to apply to the wound. This helps prevent infection. Follow instructions when using this medicine.  General care    To care for the abrasion, do the following each day for as long as directed by your healthcare provider.  ? If you were given a bandage, change it once a day. If your bandage sticks to the wound, soak it in warm water until it loosens.  ? Wash the area with soap and warm water. You may do this in a sink or under a tub faucet or shower. Rinse off the soap. Then pat the area dry with a clean towel.  ? If antibiotic ointment or cream was prescribed, reapply it to the wound as directed. Cover the wound with a fresh nonstick bandage. If the bandage becomes wet or dirty, change it as soon as possible.  ? Some antibiotic ointments or cream can cause an allergic reaction or dermatitis. This may cause redness, itching and or hives. If this occurs, stop using the ointment immediately and wash off any remaining ointment. You may need to take some allergy medicine to relieve symptoms.    You may use acetaminophen or ibuprofen to control pain unless another pain medicine was prescribed. Talk with your healthcare provider before using these medicines if you have chronic liver or kidney disease or ever had a stomach ulcer or GI bleeding. Don t use ibuprofen in children younger than six months old.    Most skin wounds heal within 10 days. But an infection may occur even with treatment. So it s important to watch the wound for signs of infection as listed below.  Follow-up care  Follow up with your healthcare provider, or as advised.  When to seek medical advice  Call your healthcare provider right away if any of these occur:    Fever of 100.4 F (38 C) or higher, or as directed by your healthcare provider    Increasing pain,  redness, swelling, or drainage from the wound    Bleeding from the wound that does not stop after a few minutes of steady, firm pressure    Decreased ability to move any body part near the wound  Date Last Reviewed: 3/3/2017    [id number] [employer]. [address] [place] [address]. All rights reserved. This information is not intended as a substitute for professional medical care. Always follow your healthcare professional's instructions.
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
## 2019-09-24 ENCOUNTER — OFFICE VISIT (OUTPATIENT)
Dept: FAMILY MEDICINE | Facility: CLINIC | Age: 59
End: 2019-09-24
Payer: COMMERCIAL

## 2019-09-24 VITALS
TEMPERATURE: 97.8 F | BODY MASS INDEX: 26.18 KG/M2 | WEIGHT: 215 LBS | HEIGHT: 76 IN | DIASTOLIC BLOOD PRESSURE: 94 MMHG | OXYGEN SATURATION: 98 % | SYSTOLIC BLOOD PRESSURE: 150 MMHG | HEART RATE: 76 BPM

## 2019-09-24 DIAGNOSIS — J06.9 UPPER RESPIRATORY TRACT INFECTION, UNSPECIFIED TYPE: Primary | ICD-10-CM

## 2019-09-24 PROCEDURE — 99213 OFFICE O/P EST LOW 20 MIN: CPT | Performed by: PHYSICIAN ASSISTANT

## 2019-09-24 RX ORDER — GUAIFENESIN/DEXTROMETHORPHAN 100-10MG/5
5 SYRUP ORAL EVERY 4 HOURS PRN
COMMUNITY
End: 2019-10-21

## 2019-09-24 ASSESSMENT — MIFFLIN-ST. JEOR: SCORE: 1873.79

## 2019-09-24 NOTE — PROGRESS NOTES
HPI: 60 yo male here for cough x one week  They did watch a 5 month old baby recently and also his dtr was in town and was sick  He also recently flew to [place] over the w/e but was already sick  sxs include: laryngitis, cough, fatigue  Has hx of pneumonia  This came on suddenly and while he was on keflex for abrasion   Cough is mildly prod of green sputum  Denies sob or wheezing  Denies hx of asthma or smoking  Not able to sleep due to cough  No assoc sore throat  Only mild nasal congestion/ headache  He just feels like not getting better.   He flew to [place] over the weekend so not resting  Plans to leave on a driving trip to [facility] tomorrow  Wife can do most of the driving  Pt allergic to codeine as a baby    Past Medical History:   Diagnosis Date     Anal fistula 2001    s/p repair     Carpal tunnel syndrome on both sides 2012     Cervical disc herniation 1986    C5-6     Elevated blood sugar      Gastroenteritis     while in [place]     Hx of colonoscopy 2008, 2019    nl     Insufficient sleep syndrome 2012    sleep test done     Normal stress echocardiogram 2002     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    one year of meds     Past Surgical History:   Procedure Laterality Date     C REPAIR  ANAL FISTULA,RECT ADV FLAP  2001     COLONOSCOPY N/A 1/7/2019    Procedure: COLONOSCOPY;  Surgeon: Fco Cross MD;  Location:  GI     LAMINECTOMY LUMBAR ONE LEVEL Left 5/15/2019    Procedure: LEFT L 5  S1 DISCECTOMY ( SISSY AGUILAR ; ERLINDA )  (MAC ANESTHESIA);  Surgeon: Angel Vera MD;  Location:  OR     Social History     Tobacco Use     Smoking status: Never Smoker     Smokeless tobacco: Never Used   Substance Use Topics     Alcohol use: Yes     Alcohol/week: 7.0 standard drinks     Types: 7 Standard drinks or equivalent per week     Comment: 8     Current Outpatient Medications   Medication Sig Dispense Refill     diphenhydrAMINE (BENADRYL ALLERGY) 25 MG tablet Take 25 mg by mouth every 6 hours as needed.        "guaiFENesin-dextromethorphan (ROBITUSSIN DM) 100-10 MG/5ML syrup Take 5 mLs by mouth every 4 hours as needed for cough       ibuprofen (ADVIL/MOTRIN) 100 MG/5ML suspension Take 10 mg/kg by mouth every 6 hours as needed for fever or moderate pain       Pseudoephedrine-guaiFENesin (MUCINEX D PO)        Allergies   Allergen Reactions     Codeine Camsylate Rash     FAMILY HISTORY NOTED AND REVIEWED    PHYSICAL EXAM:    BP (!) 150/94 (BP Location: Right arm, Cuff Size: Adult Large)   Pulse 76   Temp 97.8  F (36.6  C) (Tympanic)   Ht 1.918 m (6' 3.5\")   Wt 97.5 kg (215 lb)   SpO2 98%   BMI 26.52 kg/m      Patient appears non toxic  Ears: TMs pearly grey  Nose: no erythema or drainage  Voice no longer hoarse  Lungs: CTA bilat  Heart: RRR  Afebrile    Assessment and Plan:     (J06.9) Upper respiratory tract infection, unspecified type  (primary encounter diagnosis)  Comment:   Plan: suspect viral etiology. Pt to try benadryl at bedtime to help him sleep as not sleeping well due to cough. Pt allergic to codeine.  F/u if sxs worsen or persist.      Tish Gates PA-C        "

## 2019-10-02 ENCOUNTER — TRANSFERRED RECORDS (OUTPATIENT)
Dept: HEALTH INFORMATION MANAGEMENT | Facility: CLINIC | Age: 59
End: 2019-10-02

## 2019-10-17 ENCOUNTER — TELEPHONE (OUTPATIENT)
Dept: FAMILY MEDICINE | Facility: CLINIC | Age: 59
End: 2019-10-17

## 2019-10-17 NOTE — TELEPHONE ENCOUNTER
"PCP,    Spoke with pt  He would like to come in to discuss his BP  Last OV (9/24 for URI) it was 150/94  Pt states the automatic reader always reads higher than manual   He just wants to make sure he's \"not missing something\" he's wondering if he needs to start on a medication  He continues to exercise 2-3 days a week     Denies head aches, vision changes, chest pain, or SOB  States his face gets red at times and people as him if he's been in the sun    Next available appt isn't until 12/2- are you able to get him in sooner? He understands if he needs to wait? Recommend team appt?    Please advise    Thank you,  True ROBLES RN  "

## 2019-10-17 NOTE — TELEPHONE ENCOUNTER
Reason for call:  Other   Patient called regarding (reason for call): appointment  Additional comments: patient asked to be seen sooner than December 2nd for concerns regarding his blood pressure.     Phone number to reach patient:  Cell number on file:    Telephone Information:   Mobile [phone]       Best Time:  Any    Can we leave a detailed message on this number?  YES

## 2019-10-21 ENCOUNTER — OFFICE VISIT (OUTPATIENT)
Dept: FAMILY MEDICINE | Facility: CLINIC | Age: 59
End: 2019-10-21
Payer: COMMERCIAL

## 2019-10-21 VITALS
DIASTOLIC BLOOD PRESSURE: 86 MMHG | HEART RATE: 80 BPM | WEIGHT: 214 LBS | HEIGHT: 76 IN | TEMPERATURE: 97.7 F | SYSTOLIC BLOOD PRESSURE: 142 MMHG | OXYGEN SATURATION: 99 % | BODY MASS INDEX: 26.06 KG/M2

## 2019-10-21 DIAGNOSIS — R03.0 ELEVATED BP WITHOUT DIAGNOSIS OF HYPERTENSION: Primary | ICD-10-CM

## 2019-10-21 LAB
ALBUMIN UR-MCNC: NEGATIVE MG/DL
APPEARANCE UR: CLEAR
BILIRUB UR QL STRIP: NEGATIVE
COLOR UR AUTO: YELLOW
GLUCOSE UR STRIP-MCNC: NEGATIVE MG/DL
HGB UR QL STRIP: NEGATIVE
KETONES UR STRIP-MCNC: NEGATIVE MG/DL
LEUKOCYTE ESTERASE UR QL STRIP: NEGATIVE
NITRATE UR QL: NEGATIVE
PH UR STRIP: 5.5 PH (ref 5–7)
SOURCE: NORMAL
SP GR UR STRIP: <=1.005 (ref 1–1.03)
TSH SERPL DL<=0.005 MIU/L-ACNC: 2.11 MU/L (ref 0.4–4)
UROBILINOGEN UR STRIP-ACNC: 0.2 EU/DL (ref 0.2–1)

## 2019-10-21 PROCEDURE — 99213 OFFICE O/P EST LOW 20 MIN: CPT | Performed by: INTERNAL MEDICINE

## 2019-10-21 PROCEDURE — 84443 ASSAY THYROID STIM HORMONE: CPT | Performed by: INTERNAL MEDICINE

## 2019-10-21 PROCEDURE — 81003 URINALYSIS AUTO W/O SCOPE: CPT | Performed by: INTERNAL MEDICINE

## 2019-10-21 PROCEDURE — 36415 COLL VENOUS BLD VENIPUNCTURE: CPT | Performed by: INTERNAL MEDICINE

## 2019-10-21 ASSESSMENT — MIFFLIN-ST. JEOR: SCORE: 1869.26

## 2019-10-21 NOTE — PROGRESS NOTES
The patient presents for elevated blood pressure.  He is been checking it lately and it has been higher.  His mom and dad had hypertension and a brother has it.  The patient does not smoke or drink excessively.  Caffeine is 2 a day.  He uses occasional NSAIDs.  No added salt diet.    Patient feels quite well.  He works out.  No cardiovascular, respiratory symptoms.  No neurologic symptoms.  Recent labs in August were reviewed.    Past Medical History:   Diagnosis Date     Anal fistula 2001    s/p repair     Carpal tunnel syndrome on both sides 2012     Cervical disc herniation 1986    C5-6     Elevated blood sugar      Gastroenteritis     while in [place]     Hx of colonoscopy 2008, 2019    nl     Insufficient sleep syndrome 2012    sleep test done     Normal stress echocardiogram 2002     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    one year of meds     Screening for heart disease 10/2019    ebct score of 0     Past Surgical History:   Procedure Laterality Date     C REPAIR  ANAL FISTULA,RECT ADV FLAP  2001     COLONOSCOPY N/A 1/7/2019    Procedure: COLONOSCOPY;  Surgeon: Fco Cross MD;  Location:  GI     LAMINECTOMY LUMBAR ONE LEVEL Left 5/15/2019    Procedure: LEFT L 5  S1 DISCECTOMY ( SHEEHAN FRAME ; ERLINDA )  (MAC ANESTHESIA);  Surgeon: Angel Vera MD;  Location:  OR     Social History     Socioeconomic History     Marital status:      Spouse name: Not on file     Number of children: 4     Years of education: Not on file     Highest education level: Not on file   Occupational History     Not on file   Social Needs     Financial resource strain: Not on file     Food insecurity:     Worry: Not on file     Inability: Not on file     Transportation needs:     Medical: Not on file     Non-medical: Not on file   Tobacco Use     Smoking status: Never Smoker     Smokeless tobacco: Never Used   Substance and Sexual Activity     Alcohol use: Yes     Alcohol/week: 7.0 standard drinks     Types: 7 Standard drinks or  "equivalent per week     Comment: 8     Drug use: No     Sexual activity: Yes     Partners: Female   Lifestyle     Physical activity:     Days per week: Not on file     Minutes per session: Not on file     Stress: Not on file   Relationships     Social connections:     Talks on phone: Not on file     Gets together: Not on file     Attends Sabianism service: Not on file     Active member of club or organization: Not on file     Attends meetings of clubs or organizations: Not on file     Relationship status: Not on file     Intimate partner violence:     Fear of current or ex partner: Not on file     Emotionally abused: Not on file     Physically abused: Not on file     Forced sexual activity: Not on file   Other Topics Concern     Parent/sibling w/ CABG, MI or angioplasty before 65F 55M? Not Asked   Social History Narrative     Not on file     Current Outpatient Medications   Medication Sig Dispense Refill     diphenhydrAMINE (BENADRYL ALLERGY) 25 MG tablet Take 25 mg by mouth every 6 hours as needed.       Allergies   Allergen Reactions     Codeine Camsylate Rash     FAMILY HISTORY NOTED AND REVIEWED    REVIEW OF SYSTEMS: above    PHYSICAL EXAM    BP (!) 142/86   Pulse 80   Temp 97.7  F (36.5  C) (Tympanic)   Ht 1.918 m (6' 3.5\")   Wt 97.1 kg (214 lb)   SpO2 99%   BMI 26.40 kg/m    Blood pressure similar bilat  Patient appears non toxic  Lungs - clear, normal flow  Cardiovascular - regular rate and rhythm, no murmer, rub or gallop, no jvp or edema, carotids within normal limits, no bruits.  Abdomen - normal active bowel sounds, soft, non tender, no masses, guarding or rebound, no hepatosplenomegaly    Labs no flank bruit    ASSESSMENT:  Elevated blood pressure, ?real, first need to determine that.  Will check labs for 2nd cause.  If blood pressure real then add acei    PLAN:  Labs  24 hour blood pressure monitor  Follow up after that by phone      Wyatt Loco M.D.          "

## 2019-10-30 ENCOUNTER — HEALTH MAINTENANCE LETTER (OUTPATIENT)
Age: 59
End: 2019-10-30

## 2019-10-31 ENCOUNTER — HOSPITAL ENCOUNTER (OUTPATIENT)
Dept: CARDIOLOGY | Facility: CLINIC | Age: 59
Discharge: HOME | End: 2019-10-31
Attending: INTERNAL MEDICINE | Admitting: INTERNAL MEDICINE
Payer: COMMERCIAL

## 2019-10-31 DIAGNOSIS — R03.0 ELEVATED BP WITHOUT DIAGNOSIS OF HYPERTENSION: ICD-10-CM

## 2019-10-31 PROCEDURE — 93790 AMBL BP MNTR W/SW I&R: CPT | Performed by: INTERNAL MEDICINE

## 2019-10-31 PROCEDURE — 93788 AMBL BP MNTR W/SW A/R: CPT

## 2019-11-05 PROBLEM — I10 ESSENTIAL HYPERTENSION: Status: ACTIVE | Noted: 2019-11-01

## 2019-12-05 ENCOUNTER — TELEPHONE (OUTPATIENT)
Dept: FAMILY MEDICINE | Facility: CLINIC | Age: 59
End: 2019-12-05

## 2019-12-05 NOTE — TELEPHONE ENCOUNTER
Reason for Call:  Other     Detailed comments: Pt would like to see dr. Loco in the next two to three weeks. He will be traveling from 12/17-12/20  Phone Number Patient can be reached at: Cell number on file:    Telephone Information:   Mobile [phone]       Best Time: any    Can we leave a detailed message on this number? YES    Call taken on 12/5/2019 at 4:00 PM by Candy Cross

## 2019-12-09 NOTE — TELEPHONE ENCOUNTER
The patient called and said that he can not come tomorrow at 11:45 he has a meeting but can do tomorrow afternoon or Friday  Can you see him at anytime tomorrow afternoon or Friday?

## 2019-12-13 ENCOUNTER — OFFICE VISIT (OUTPATIENT)
Dept: FAMILY MEDICINE | Facility: CLINIC | Age: 59
End: 2019-12-13
Payer: COMMERCIAL

## 2019-12-13 VITALS — DIASTOLIC BLOOD PRESSURE: 74 MMHG | SYSTOLIC BLOOD PRESSURE: 130 MMHG | HEART RATE: 80 BPM

## 2019-12-13 DIAGNOSIS — I10 ESSENTIAL HYPERTENSION: Primary | ICD-10-CM

## 2019-12-13 PROCEDURE — 99213 OFFICE O/P EST LOW 20 MIN: CPT | Performed by: INTERNAL MEDICINE

## 2019-12-13 RX ORDER — VALSARTAN 160 MG/1
160 TABLET ORAL DAILY
Qty: 90 TABLET | Refills: 3 | Status: SHIPPED | OUTPATIENT
Start: 2019-12-13 | End: 2020-08-20

## 2019-12-13 NOTE — PROGRESS NOTES
The patient presents to follow-up his hypertension.  As noted, he had a 24-hour monitor and after that I added lisinopril 5 mg.  Is been taking that daily.  He has not been checking his blood pressure very regularly.    He does note a dry cough since starting this.  He also in the morning can wake up with a slight right-sided headache that goes away.  Both started with the medication.  Occasionally he is lightheaded when he stands.  He does not have chest pain or shortness of breath or edema.  Caffeine is 3 a day, no NSAIDs and minimal salt.    Past Medical History:   Diagnosis Date     Anal fistula 2001    s/p repair     Carpal tunnel syndrome on both sides 2012     Cervical disc herniation 1986    C5-6     Elevated blood sugar      Essential hypertension 11/2019    added med then after 24 hour bp monitor     Gastroenteritis     while in [facility]     Hx of colonoscopy 2008, 2019    nl     Insufficient sleep syndrome 2012    sleep test done     Normal stress echocardiogram 2002     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    one year of meds     Screening for heart disease 10/2019    ebct score of 0     Past Surgical History:   Procedure Laterality Date     C REPAIR  ANAL FISTULA,RECT ADV FLAP  2001     COLONOSCOPY N/A 1/7/2019    Procedure: COLONOSCOPY;  Surgeon: Fco Cross MD;  Location:  GI     LAMINECTOMY LUMBAR ONE LEVEL Left 5/15/2019    Procedure: LEFT L 5  S1 DISCECTOMY ( SHEEHAN FRAME ; ERLINDA )  (MAC ANESTHESIA);  Surgeon: Angel Vera MD;  Location:  OR     Social History     Socioeconomic History     Marital status:      Spouse name: Not on file     Number of children: 4     Years of education: Not on file     Highest education level: Not on file   Occupational History     Not on file   Social Needs     Financial resource strain: Not on file     Food insecurity:     Worry: Not on file     Inability: Not on file     Transportation needs:     Medical: Not on file     Non-medical: Not on file   Tobacco Use      Smoking status: Never Smoker     Smokeless tobacco: Never Used   Substance and Sexual Activity     Alcohol use: Yes     Alcohol/week: 7.0 standard drinks     Types: 7 Standard drinks or equivalent per week     Comment: 8     Drug use: No     Sexual activity: Yes     Partners: Female   Lifestyle     Physical activity:     Days per week: Not on file     Minutes per session: Not on file     Stress: Not on file   Relationships     Social connections:     Talks on phone: Not on file     Gets together: Not on file     Attends Confucianist service: Not on file     Active member of club or organization: Not on file     Attends meetings of clubs or organizations: Not on file     Relationship status: Not on file     Intimate partner violence:     Fear of current or ex partner: Not on file     Emotionally abused: Not on file     Physically abused: Not on file     Forced sexual activity: Not on file   Other Topics Concern     Parent/sibling w/ CABG, MI or angioplasty before 65F 55M? Not Asked   Social History Narrative     Not on file     Current Outpatient Medications   Medication Sig Dispense Refill     valsartan (DIOVAN) 160 MG tablet Take 1 tablet (160 mg) by mouth daily 90 tablet 3     diphenhydrAMINE (BENADRYL ALLERGY) 25 MG tablet Take 25 mg by mouth every 6 hours as needed.       Allergies   Allergen Reactions     Codeine Camsylate Rash     FAMILY HISTORY NOTED AND REVIEWED    REVIEW OF SYSTEMS: above    PHYSICAL EXAM    /74   Pulse 80     Patient appears non toxic  Lungs - clear, normal flow  Cardiovascular - regular rate and rhythm, no murmer, rub or gallop, no jvp or edema, carotids within normal limits, no bruits.  Abdomen - normal active bowel sounds, soft, non tender, no masses, guarding or rebound, no hepatosplenomegaly    Labs noted    ASSESSMENT:  1. Hypertension, controlled  2. Ha, light headed and cough, suspect med    PLAN:  Discontinue lisinopril and to diovan 160mg  Call if cough, light headed, ha not  gone in next 10 days  Monitor blood pressure and call if up      Wyatt Loco M.D.

## 2019-12-13 NOTE — PATIENT INSTRUCTIONS
Stop the lisinopril and start valsartan 160mg daily.  Call me if your cough and headache is not gone over the next 10 days.  Monitor your blood pressure and let me know if it is not around 130/80    Wyatt Loco M.D.

## 2020-01-16 ENCOUNTER — TELEPHONE (OUTPATIENT)
Dept: FAMILY MEDICINE | Facility: CLINIC | Age: 60
End: 2020-01-16

## 2020-01-16 ENCOUNTER — OFFICE VISIT (OUTPATIENT)
Dept: FAMILY MEDICINE | Facility: CLINIC | Age: 60
End: 2020-01-16
Payer: COMMERCIAL

## 2020-01-16 ENCOUNTER — ANCILLARY PROCEDURE (OUTPATIENT)
Dept: GENERAL RADIOLOGY | Facility: CLINIC | Age: 60
End: 2020-01-16
Attending: INTERNAL MEDICINE
Payer: COMMERCIAL

## 2020-01-16 ENCOUNTER — MYC MEDICAL ADVICE (OUTPATIENT)
Dept: FAMILY MEDICINE | Facility: CLINIC | Age: 60
End: 2020-01-16

## 2020-01-16 VITALS
TEMPERATURE: 97 F | SYSTOLIC BLOOD PRESSURE: 127 MMHG | DIASTOLIC BLOOD PRESSURE: 76 MMHG | OXYGEN SATURATION: 99 % | HEART RATE: 81 BPM | HEIGHT: 75 IN | WEIGHT: 211 LBS | BODY MASS INDEX: 26.24 KG/M2

## 2020-01-16 DIAGNOSIS — I10 ESSENTIAL HYPERTENSION: ICD-10-CM

## 2020-01-16 DIAGNOSIS — M79.675 GREAT TOE PAIN, LEFT: ICD-10-CM

## 2020-01-16 DIAGNOSIS — M79.675 GREAT TOE PAIN, LEFT: Primary | ICD-10-CM

## 2020-01-16 PROCEDURE — 99213 OFFICE O/P EST LOW 20 MIN: CPT | Performed by: INTERNAL MEDICINE

## 2020-01-16 PROCEDURE — 73660 X-RAY EXAM OF TOE(S): CPT | Mod: LT

## 2020-01-16 RX ORDER — COLCHICINE 0.6 MG/1
TABLET ORAL
Qty: 1 TABLET | Refills: 1 | Status: SHIPPED | OUTPATIENT
Start: 2020-01-16 | End: 2020-01-17

## 2020-01-16 ASSESSMENT — MIFFLIN-ST. JEOR: SCORE: 1839.78

## 2020-01-16 NOTE — PROGRESS NOTES
The patient presents for left great toe pain.  He has no history of gout.  He did eat more than usual over the holidays and then was recently on a trip to [place].  There may have been trauma to his toe.  Is been ongoing for 2 to 3 weeks.  It is tender to the touch.  He does not hurt otherwise.  No other joint pains.  He feels fine.  He is not feeling ill.    With respect to his hypertension as noted I changed him from an ACE inhibitor to Diovan due to a cough, headaches and dizziness.  All his symptoms has resolved    Past Medical History:   Diagnosis Date     Anal fistula 2001    s/p repair     Carpal tunnel syndrome on both sides 2012     Cervical disc herniation 1986    C5-6     Elevated blood sugar      Essential hypertension 11/2019    added med then after 24 hour bp monitor     Gastroenteritis     while in [place]     Hx of colonoscopy 2008, 2019    nl     Insufficient sleep syndrome 2012    sleep test done     Normal stress echocardiogram 2002     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    one year of meds     Screening for heart disease 10/2019    ebct score of 0     Past Surgical History:   Procedure Laterality Date     C REPAIR  ANAL FISTULA,RECT ADV FLAP  2001     COLONOSCOPY N/A 1/7/2019    Procedure: COLONOSCOPY;  Surgeon: Fco Cross MD;  Location:  GI     LAMINECTOMY LUMBAR ONE LEVEL Left 5/15/2019    Procedure: LEFT L 5  S1 DISCECTOMY ( SHEEHAN FRAME ; ERLINDA )  (MAC ANESTHESIA);  Surgeon: Angel Vera MD;  Location:  OR     Social History     Socioeconomic History     Marital status:      Spouse name: Not on file     Number of children: 4     Years of education: Not on file     Highest education level: Not on file   Occupational History     Not on file   Social Needs     Financial resource strain: Not on file     Food insecurity:     Worry: Not on file     Inability: Not on file     Transportation needs:     Medical: Not on file     Non-medical: Not on file   Tobacco Use     Smoking status: Never  "Smoker     Smokeless tobacco: Never Used   Substance and Sexual Activity     Alcohol use: Yes     Alcohol/week: 7.0 standard drinks     Types: 7 Standard drinks or equivalent per week     Comment: 8     Drug use: No     Sexual activity: Yes     Partners: Female   Lifestyle     Physical activity:     Days per week: Not on file     Minutes per session: Not on file     Stress: Not on file   Relationships     Social connections:     Talks on phone: Not on file     Gets together: Not on file     Attends Faith service: Not on file     Active member of club or organization: Not on file     Attends meetings of clubs or organizations: Not on file     Relationship status: Not on file     Intimate partner violence:     Fear of current or ex partner: Not on file     Emotionally abused: Not on file     Physically abused: Not on file     Forced sexual activity: Not on file   Other Topics Concern     Parent/sibling w/ CABG, MI or angioplasty before 65F 55M? Not Asked   Social History Narrative     Not on file     Current Outpatient Medications   Medication Sig Dispense Refill     colchicine (COLCYRS) 0.6 MG tablet Take one pill twice daily for the first day then one daily until the pain is gone 1 tablet 1     diphenhydrAMINE (BENADRYL ALLERGY) 25 MG tablet Take 25 mg by mouth every 6 hours as needed.       valsartan (DIOVAN) 160 MG tablet Take 1 tablet (160 mg) by mouth daily 90 tablet 3     Allergies   Allergen Reactions     Ace Inhibitors Cough     Codeine Camsylate Rash     FAMILY HISTORY NOTED AND REVIEWED    REVIEW OF SYSTEMS: above    PHYSICAL EXAM    /76 (BP Location: Right arm, Patient Position: Chair, Cuff Size: Adult Large)   Pulse 81   Temp 97  F (36.1  C) (Oral)   Ht 1.892 m (6' 2.5\")   Wt 95.7 kg (211 lb)   SpO2 99%   BMI 26.73 kg/m      Patient appears non toxic  Left great toe with slight redness on top, slight swelling, tender to touch, lat toe and foot within normal limits, not painful, remainder " of left foot and ankle within normal limits, right foot and ankle within normal limits.    Xray to be done    ASSESSMENT:  1. Possible gout, doubt fx, infection, cellulitis  2. Hypertension, controlled    PLAN:  Xray and if neg colchicine prn, call if not resolving    Wyatt Loco M.D.    I  with patient re xray and fx.  Not to do heavy activities, advil prn and follow up with podiatry next week    Wyatt Loco M.D.

## 2020-01-16 NOTE — TELEPHONE ENCOUNTER
Please call the patient and let him know that he does have a very small fracture in that toe.  He should not take the colchicine.  He can use Advil as needed.  He should try not to be overly active but he should follow-up with podiatry next week.    Thank you    Wyatt Loco M.D.

## 2020-01-16 NOTE — TELEPHONE ENCOUNTER
Left a non detailed vm for patient, but did say not to take prescription.  Sent detailed mychart as well.  Chery Bhatti RN

## 2020-01-17 NOTE — TELEPHONE ENCOUNTER
To PCP to take a look at Colchicine.  #1 was ordered.  Please reorder if appropriate.    Thank you,  Giovana Guevara RN on 1/17/2020 at 8:15 AM

## 2020-01-17 NOTE — TELEPHONE ENCOUNTER
Done.  Pharmacy notified (voicemail) and removed from medication list.    Giovana Guevara RN on 1/17/2020 at 9:09 AM

## 2020-01-17 NOTE — TELEPHONE ENCOUNTER
COLCHICINE 0.6 MG ORAL TABLET    Pharmacy is requesting verification on the quantity to be dispensed. Please verify and resend RX.

## 2020-02-20 ENCOUNTER — ANCILLARY PROCEDURE (OUTPATIENT)
Dept: GENERAL RADIOLOGY | Facility: CLINIC | Age: 60
End: 2020-02-20
Attending: PHYSICIAN ASSISTANT
Payer: COMMERCIAL

## 2020-02-20 ENCOUNTER — OFFICE VISIT (OUTPATIENT)
Dept: URGENT CARE | Facility: URGENT CARE | Age: 60
End: 2020-02-20
Payer: COMMERCIAL

## 2020-02-20 VITALS
OXYGEN SATURATION: 98 % | HEART RATE: 76 BPM | DIASTOLIC BLOOD PRESSURE: 88 MMHG | WEIGHT: 211 LBS | TEMPERATURE: 97.7 F | BODY MASS INDEX: 26.73 KG/M2 | SYSTOLIC BLOOD PRESSURE: 141 MMHG

## 2020-02-20 DIAGNOSIS — J20.9 ACUTE BRONCHITIS WITH SYMPTOMS > 10 DAYS: Primary | ICD-10-CM

## 2020-02-20 DIAGNOSIS — R05.9 COUGH: ICD-10-CM

## 2020-02-20 PROCEDURE — 71046 X-RAY EXAM CHEST 2 VIEWS: CPT

## 2020-02-20 PROCEDURE — 99214 OFFICE O/P EST MOD 30 MIN: CPT | Performed by: PHYSICIAN ASSISTANT

## 2020-02-20 RX ORDER — BENZONATATE 200 MG/1
200 CAPSULE ORAL 3 TIMES DAILY PRN
Qty: 30 CAPSULE | Refills: 0 | Status: SHIPPED | OUTPATIENT
Start: 2020-02-20 | End: 2020-03-30

## 2020-02-20 RX ORDER — AZITHROMYCIN 250 MG/1
TABLET, FILM COATED ORAL
Qty: 6 TABLET | Refills: 0 | Status: SHIPPED | OUTPATIENT
Start: 2020-02-20 | End: 2020-03-30

## 2020-02-20 ASSESSMENT — ENCOUNTER SYMPTOMS
SHORTNESS OF BREATH: 0
COUGH: 1
CONSTITUTIONAL NEGATIVE: 1
EYES NEGATIVE: 1
CARDIOVASCULAR NEGATIVE: 1
GASTROINTESTINAL NEGATIVE: 1

## 2020-02-21 NOTE — PROGRESS NOTES
SUBJECTIVE:   Wyatt Dhillon is a 59 year old male presenting with a chief complaint of   Chief Complaint   Patient presents with     Urgent Care     Cough     Coughing for the last month.        He is an established patient of [facility].  Patient presents with complaints of cough x 3 weeks.  Patient has had a postivie PPD in past and took medications for a year.  No fevers, but cough persists.  Took OTC.  Also complaints of fatigue from coughing.  No ST or ear pain.      URI Adult    Onset of symptoms was 3 week(s) ago.  Course of illness is same.    Severity moderate  Current and Associated symptoms: cough - non-productive  Treatment measures tried include Tylenol/Ibuprofen.  Predisposing factors include None.        Review of Systems   Constitutional: Negative.    HENT: Negative.    Eyes: Negative.    Respiratory: Positive for cough. Negative for shortness of breath.    Cardiovascular: Negative.    Gastrointestinal: Negative.    Genitourinary: Negative.    Skin: Negative.    All other systems reviewed and are negative.      Past Medical History:   Diagnosis Date     Anal fistula 2001    s/p repair     Carpal tunnel syndrome on both sides 2012     Cervical disc herniation 1986    C5-6     Elevated blood sugar      Essential hypertension 11/2019    added med then after 24 hour bp monitor     Gastroenteritis     while in [place]     Hx of colonoscopy 2008, 2019    nl     Insufficient sleep syndrome 2012    sleep test done     Normal stress echocardiogram 2002     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    one year of meds     Screening for heart disease 10/2019    ebct score of 0     Family History   Problem Relation Age of Onset     Cancer Father         skin - squamous     Hypertension Father      Diabetes Mother      Hypertension Mother      Cancer - colorectal Maternal Grandfather      Heart Disease Maternal Grandfather         MI     Arthritis Paternal Grandfather      Cerebrovascular Disease No family hx of      Current Outpatient  Medications   Medication Sig Dispense Refill     valsartan (DIOVAN) 160 MG tablet Take 1 tablet (160 mg) by mouth daily 90 tablet 3     diphenhydrAMINE (BENADRYL ALLERGY) 25 MG tablet Take 25 mg by mouth every 6 hours as needed.       Social History     Tobacco Use     Smoking status: Never Smoker     Smokeless tobacco: Never Used   Substance Use Topics     Alcohol use: Yes     Alcohol/week: 7.0 standard drinks     Types: 7 Standard drinks or equivalent per week     Comment: 8       OBJECTIVE  BP (!) 141/88   Pulse 76   Temp 97.7  F (36.5  C) (Tympanic)   Wt 95.7 kg (211 lb)   SpO2 98%   BMI 26.73 kg/m      Physical Exam  Vitals signs and nursing note reviewed.   Constitutional:       General: He is not in acute distress.     Appearance: Normal appearance. He is normal weight. He is not ill-appearing, toxic-appearing or diaphoretic.   HENT:      Head: Normocephalic and atraumatic.      Right Ear: Tympanic membrane, ear canal and external ear normal.      Left Ear: Tympanic membrane, ear canal and external ear normal.      Nose: Nose normal.      Mouth/Throat:      Mouth: Mucous membranes are moist.      Pharynx: Oropharynx is clear.   Eyes:      Extraocular Movements: Extraocular movements intact.      Conjunctiva/sclera: Conjunctivae normal.   Neck:      Musculoskeletal: Normal range of motion and neck supple. No muscular tenderness.   Cardiovascular:      Rate and Rhythm: Normal rate and regular rhythm.      Pulses: Normal pulses.      Heart sounds: Normal heart sounds.   Pulmonary:      Effort: Pulmonary effort is normal. No respiratory distress.      Breath sounds: Normal breath sounds. No stridor. No wheezing, rhonchi or rales.   Lymphadenopathy:      Cervical: No cervical adenopathy.   Skin:     General: Skin is warm and dry.      Capillary Refill: Capillary refill takes less than 2 seconds.   Neurological:      General: No focal deficit present.      Mental Status: He is alert.   Psychiatric:          Mood and Affect: Mood normal.         Behavior: Behavior normal.         Labs:  Results for orders placed or performed in visit on 02/20/20 (from the past 24 hour(s))   XR Chest 2 Views    Narrative    CHEST TWO VIEWS     2/20/2020 7:44 PM     HISTORY: Cough.    COMPARISON: None.      Impression    IMPRESSION: PA and lateral views of the chest. Lungs are clear. Heart  is normal in size. No effusions are evident. No pneumothorax.  Degenerative spine changes are noted.        X-Ray was done, my findings are: NAD  Xrays personally viewed by myself.      ASSESSMENT:      ICD-10-CM    1. Cough R05 XR Chest 2 Views   2. Acute bronchitis with symptoms > 10 days J20.9         Medical Decision Making:    Differential Diagnosis:  URI Adult/Peds:  Bronchitis-viral, pneumonia,     Serious Comorbid Conditions:  Adult:  None    PLAN:    URI Adult:  Tylenol, Ibuprofen, Fluids, Rest and zpak and tessalon perles    Followup:    If not improving or if condition worsens, follow up with your Primary Care Provider, If not improving or if conditions worsens over the next 12-24 hours, go to the Emergency Department    Patient Instructions     Patient Education     Bronchitis, Antibiotic Treatment (Adult)    Bronchitis is an infection of the air passages (bronchial tubes) in your lungs. It often occurs when you have a cold. This illness is contagious during the first few days and is spread through the air by coughing and sneezing, or by direct contact (touching the sick person and then touching your own eyes, nose, or mouth).  Symptoms of bronchitis include cough with mucus (phlegm) and low-grade fever. Bronchitis usually lasts 7 to 14 days. Mild cases can be treated with simple home remedies. More severe infection is treated with an antibiotic.  Home care  Follow these guidelines when caring for yourself at home:    If your symptoms are severe, rest at home for the first 2 to 3 days. When you go back to your usual activities, don't let  yourself get too tired.    Don't smoke. Also stay away from secondhand smoke.    You may use over-the-counter medicines to control fever or pain, unless another medicine was prescribed. If you have chronic liver or kidney disease or have ever had a stomach ulcer or gastrointestinal bleeding, talk with your healthcare provider before using these medicines. Also talk to your provider if you are taking medicine to prevent blood clots. Aspirin should never be given to anyone younger than 18 who is ill with a viral infection or fever. It may cause severe liver or brain damage.    Your appetite may be low, so a light diet is fine. Stay well hydrated by drinking 6 to 8 glasses of fluids per day. This includes water, soft drinks, sports drinks, juices, tea, or soup. Extra fluids will help loosen mucus in your nose and lungs.    Over-the-counter cough, cold, and sore-throat medicines will not shorten the length of the illness, but they may be helpful to reduce your symptoms. Don't use decongestants if you have high blood pressure.    Finish all antibiotic medicine. Do this even if you are feeling better after only a few days.  Follow-up care  Follow up with your healthcare provider, or as advised. If you had an X-ray or ECG (electrocardiogram), a specialist will review it. You will be told of any new test results that may affect your care.  If you are age 65 or older, if you smoke, or if you have a chronic lung disease or condition that affects your immune system, ask your healthcare provider about getting a pneumococcal vaccine and a yearly flu shot (influenza vaccine).  When to seek medical advice  Call your healthcare provider right away if any of these occur:    Fever of 100.4 F (38 C) or higher, or as directed by your healthcare provider    Coughing up more sputum    Weakness, drowsiness, headache, facial pain, ear pain, or a stiff neck  Call 911  Call 911 if any of these occur.    Coughing up blood    Weakness,  drowsiness, headache, or stiff neck that get worse    Trouble breathing, wheezing, or pain with breathing  Date Last Reviewed: 6/1/2018 2000-2019 [employer]. [address] [place] [address], [place] [address]. All rights reserved. This information is not intended as a substitute for professional medical care. Always follow your healthcare professional's instructions.

## 2020-02-21 NOTE — PATIENT INSTRUCTIONS

## 2020-03-30 ENCOUNTER — VIRTUAL VISIT (OUTPATIENT)
Dept: FAMILY MEDICINE | Facility: CLINIC | Age: 60
End: 2020-03-30
Payer: COMMERCIAL

## 2020-03-30 DIAGNOSIS — I10 ESSENTIAL HYPERTENSION: Primary | ICD-10-CM

## 2020-03-30 PROCEDURE — 99213 OFFICE O/P EST LOW 20 MIN: CPT | Mod: TEL | Performed by: INTERNAL MEDICINE

## 2020-03-30 RX ORDER — AMLODIPINE BESYLATE 5 MG/1
5 TABLET ORAL DAILY
Qty: 90 TABLET | Refills: 1 | Status: SHIPPED | OUTPATIENT
Start: 2020-03-30 | End: 2020-08-20

## 2020-03-30 NOTE — PROGRESS NOTES
"Wyatt Dhillon is a 59 year old male who is being evaluated via a telephone visit.      The patient has been notified of following (by RAKEL Page CMA       \"We have found that certain health care needs can be provided without the need for a physical exam.  This service lets us provide the care you need with a short phone conversation.  If a prescription is necessary we can send it directly to your pharmacy.  If lab work is needed we can place an order for that and you can then stop by our lab to have the test done at a later time.    This telephone visit will be conducted via 3 way call with the you (the patient) , the physician/provider, and a me all on the line at the same time.  This allows your physician/provider to have the phone conversation with you while I will be taking notes for your medical record.  We will have full access to your [facility] medical record during this entire phone call.    Since this is like an office visit,  will bill your insurance company for this service.  Please check with your medical insurance if this type of telephone/virtual is covered . You may be responsible for the cost of this service if insurance coverage is denied.  The typical cost is $30 (10min), $59(11-20min) and $85 (21-30min)     If during the course of the call the physician/provider feels a telephone visit is not appropriate, you will not be charged for this service\"    Consent has been obtained for this service by care team member: yes.  See the scanned image in the medical record.      I called patient.  He is worried about being on an arb with the current covid issue.  Also, his blood pressure has not been great on the medicine.    Given above will change to norvasc 5mg and stop the valsartan.  He will call if side effects.  He will update in 5 weeks.    Wyatt Loco M.D.    Time 6:42  "

## 2020-05-06 ENCOUNTER — NURSE TRIAGE (OUTPATIENT)
Dept: NURSING | Facility: CLINIC | Age: 60
End: 2020-05-06

## 2020-05-06 NOTE — TELEPHONE ENCOUNTER
Patient is calling requesting COVID serologic antibody testing. Patient reporting he was in direct contact with a colleague that was asymptomatic and tested positive on Friday 5/1/20. Patient denies current symptoms.     NOTE: Serologic testing is a blood test for 'antibodies' which are made at 10-14 days after you have had symptoms of COVID or were exposed and had an asymptomatic infection.  This does NOT test you for 'active' infection or tell you if you are contagious.    Are you a healthcare worker?  No  Do you have cough, fever, myalgias, or shortness of breath?  No  Were you exposed to a lab confirmed positive or possible case of COVID-19?  Confirmed exposure 14 days ago.  Confirmed exposure < 14 days ago.  Recommend they wait for testing until it has been 14 days as it can take 2 weeks after exposure for the test to be positive.    Patient agrees to call back after 14 days to schedule antibody testing.    Caller verbalized understanding. Denies further questions.    Dalia Garza RN  [facility]      Reason for Disposition    Health Information question, no triage required and triager able to answer question    Additional Information    Negative: [1] Caller is not with the adult (patient) AND [2] reporting urgent symptoms    Negative: Lab result questions    Negative: Medication questions    Negative: Caller can't be reached by phone    Negative: Caller has already spoken to PCP or another triager    Negative: RN needs further essential information from caller in order to complete triage    Negative: Requesting regular office appointment    Negative: [1] Caller requesting NON-URGENT health information AND [2] PCP's office is the best resource    Protocols used: INFORMATION ONLY CALL-A-

## 2020-05-12 ENCOUNTER — NURSE TRIAGE (OUTPATIENT)
Dept: NURSING | Facility: CLINIC | Age: 60
End: 2020-05-12

## 2020-05-12 DIAGNOSIS — Z20.828 EXPOSURE TO SARS-ASSOCIATED CORONAVIRUS: Primary | ICD-10-CM

## 2020-05-12 NOTE — TELEPHONE ENCOUNTER
"Patient is calling requesting COVID serologic antibody testing.  NOTE: Serologic testing is a blood test for 'antibodies' which are made at 10-14 days after you have had symptoms of COVID or were exposed and had an asymptomatic infection.  This does NOT test you for 'active' infection or tell you if you are contagious.    Are you a healthcare worker?  No  Do you have cough, fever, myalgias, or shortness of breath?  No  Were you exposed to a lab confirmed positive or possible case of COVID-19?  Possible exposure 14 days ago.  Possible exposure > 14 days ago.      The patient was informed: \"Testing is limited each day and it may take time for testing to be available to everyone who has called.  We will be calling you to schedule testing- please confirm the best number to reach you is [phone].\"    Lab order placed per COVID Serologic Testing standing orders.    Stella Pierre RN  [facility]          "

## 2020-05-15 DIAGNOSIS — Z20.828 EXPOSURE TO SARS-ASSOCIATED CORONAVIRUS: ICD-10-CM

## 2020-05-15 PROCEDURE — 36415 COLL VENOUS BLD VENIPUNCTURE: CPT | Performed by: EMERGENCY MEDICINE

## 2020-05-15 PROCEDURE — 86769 SARS-COV-2 COVID-19 ANTIBODY: CPT | Mod: 90 | Performed by: EMERGENCY MEDICINE

## 2020-05-15 PROCEDURE — 99000 SPECIMEN HANDLING OFFICE-LAB: CPT | Performed by: EMERGENCY MEDICINE

## 2020-05-18 LAB
COVID-19 SPIKE RBD ABY TITER: NORMAL
COVID-19 SPIKE RBD ABY: NEGATIVE

## 2020-08-17 ENCOUNTER — DOCUMENTATION ONLY (OUTPATIENT)
Dept: FAMILY MEDICINE | Facility: CLINIC | Age: 60
End: 2020-08-17

## 2020-08-17 DIAGNOSIS — Z00.00 ROUTINE GENERAL MEDICAL EXAMINATION AT A HEALTH CARE FACILITY: Primary | ICD-10-CM

## 2020-08-19 DIAGNOSIS — Z00.00 ROUTINE GENERAL MEDICAL EXAMINATION AT A HEALTH CARE FACILITY: ICD-10-CM

## 2020-08-19 LAB
ERYTHROCYTE [DISTWIDTH] IN BLOOD BY AUTOMATED COUNT: 13 % (ref 10–15)
HBA1C MFR BLD: 5.5 % (ref 0–5.6)
HCT VFR BLD AUTO: 43.3 % (ref 40–53)
HGB BLD-MCNC: 14.8 G/DL (ref 13.3–17.7)
MCH RBC QN AUTO: 32.8 PG (ref 26.5–33)
MCHC RBC AUTO-ENTMCNC: 34.2 G/DL (ref 31.5–36.5)
MCV RBC AUTO: 96 FL (ref 78–100)
PLATELET # BLD AUTO: 181 10E9/L (ref 150–450)
RBC # BLD AUTO: 4.51 10E12/L (ref 4.4–5.9)
WBC # BLD AUTO: 4 10E9/L (ref 4–11)

## 2020-08-19 PROCEDURE — G0103 PSA SCREENING: HCPCS | Performed by: INTERNAL MEDICINE

## 2020-08-19 PROCEDURE — 83036 HEMOGLOBIN GLYCOSYLATED A1C: CPT | Performed by: INTERNAL MEDICINE

## 2020-08-19 PROCEDURE — 80053 COMPREHEN METABOLIC PANEL: CPT | Performed by: INTERNAL MEDICINE

## 2020-08-19 PROCEDURE — 80061 LIPID PANEL: CPT | Performed by: INTERNAL MEDICINE

## 2020-08-19 PROCEDURE — 85027 COMPLETE CBC AUTOMATED: CPT | Performed by: INTERNAL MEDICINE

## 2020-08-19 PROCEDURE — 36415 COLL VENOUS BLD VENIPUNCTURE: CPT | Performed by: INTERNAL MEDICINE

## 2020-08-20 ENCOUNTER — VIRTUAL VISIT (OUTPATIENT)
Dept: FAMILY MEDICINE | Facility: CLINIC | Age: 60
End: 2020-08-20
Payer: COMMERCIAL

## 2020-08-20 VITALS — WEIGHT: 202 LBS | SYSTOLIC BLOOD PRESSURE: 129 MMHG | BODY MASS INDEX: 25.59 KG/M2 | DIASTOLIC BLOOD PRESSURE: 82 MMHG

## 2020-08-20 DIAGNOSIS — Z00.00 ROUTINE GENERAL MEDICAL EXAMINATION AT A HEALTH CARE FACILITY: Primary | ICD-10-CM

## 2020-08-20 DIAGNOSIS — I10 ESSENTIAL HYPERTENSION: ICD-10-CM

## 2020-08-20 DIAGNOSIS — R73.9 ELEVATED BLOOD SUGAR: ICD-10-CM

## 2020-08-20 LAB
ALBUMIN SERPL-MCNC: 4.2 G/DL (ref 3.4–5)
ALP SERPL-CCNC: 100 U/L (ref 40–150)
ALT SERPL W P-5'-P-CCNC: 25 U/L (ref 0–70)
ANION GAP SERPL CALCULATED.3IONS-SCNC: 4 MMOL/L (ref 3–14)
AST SERPL W P-5'-P-CCNC: 21 U/L (ref 0–45)
BILIRUB SERPL-MCNC: 0.7 MG/DL (ref 0.2–1.3)
BUN SERPL-MCNC: 28 MG/DL (ref 7–30)
CALCIUM SERPL-MCNC: 9.3 MG/DL (ref 8.5–10.1)
CHLORIDE SERPL-SCNC: 107 MMOL/L (ref 94–109)
CHOLEST SERPL-MCNC: 168 MG/DL
CO2 SERPL-SCNC: 27 MMOL/L (ref 20–32)
CREAT SERPL-MCNC: 0.94 MG/DL (ref 0.66–1.25)
GFR SERPL CREATININE-BSD FRML MDRD: 86 ML/MIN/{1.73_M2}
GLUCOSE SERPL-MCNC: 108 MG/DL (ref 70–99)
HDLC SERPL-MCNC: 75 MG/DL
LDLC SERPL CALC-MCNC: 75 MG/DL
NONHDLC SERPL-MCNC: 93 MG/DL
POTASSIUM SERPL-SCNC: 4.6 MMOL/L (ref 3.4–5.3)
PROT SERPL-MCNC: 7.9 G/DL (ref 6.8–8.8)
PSA SERPL-ACNC: 1.51 UG/L (ref 0–4)
SODIUM SERPL-SCNC: 138 MMOL/L (ref 133–144)
TRIGL SERPL-MCNC: 88 MG/DL

## 2020-08-20 PROCEDURE — 99396 PREV VISIT EST AGE 40-64: CPT | Mod: 95 | Performed by: INTERNAL MEDICINE

## 2020-08-20 RX ORDER — VALSARTAN 160 MG/1
160 TABLET ORAL DAILY
Qty: 90 TABLET | Refills: 3 | Status: SHIPPED | OUTPATIENT
Start: 2020-08-20 | End: 2021-09-08

## 2020-08-20 NOTE — PROGRESS NOTES
"Wyatt Dhillon is a 60 year old male who is being evaluated via a billable video visit.      The patient has been notified of following:     \"This video visit will be conducted via a call between you and your physician/provider. We have found that certain health care needs can be provided without the need for an in-person physical exam.  This service lets us provide the care you need with a video conversation.  If a prescription is necessary we can send it directly to your pharmacy.  If lab work is needed we can place an order for that and you can then stop by our lab to have the test done at a later time.    Video visits are billed at different rates depending on your insurance coverage.  Please reach out to your insurance provider with any questions.    If during the course of the call the physician/provider feels a video visit is not appropriate, you will not be charged for this service.\"    Patient has given verbal consent for Video visit? Yes  How would you like to obtain your AVS? MyChart  If you are dropped from the video visit, the video invite should be resent to: Text to cell phone: [phone]   Will anyone else be joining your video visit? No      Subjective      Wyatt Dhillon is a 60 year old male who presents today via video visit for the following health issues:    He is doing super, working out, weight down.  No neck issues, occasionally ct symptoms but not often.  No chest pain or shortness of breath,. mno gi or genitourinary issues.  Blood pressure is fine.  Occasionally can be light headed briefly when stands but not otherwisae    Past Medical History:   Diagnosis Date     Anal fistula 2001    s/p repair     Carpal tunnel syndrome on both sides 2012     Cervical disc herniation 1986    C5-6     Elevated blood sugar      Essential hypertension 11/2019    added med then after 24 hour bp monitor, had cough and changed from acei to diovan, edema with norvasc     Gastroenteritis     while in " [place]     Hx of colonoscopy 2008, 2019    nl     Insufficient sleep syndrome 2012    sleep test done     Normal stress echocardiogram 2002     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    one year of meds     Screening for heart disease 10/2019    ebct score of 0     Past Surgical History:   Procedure Laterality Date     C REPAIR  ANAL FISTULA,RECT ADV FLAP  2001     COLONOSCOPY N/A 1/7/2019    Procedure: COLONOSCOPY;  Surgeon: Fco Cross MD;  Location:  GI     LAMINECTOMY LUMBAR ONE LEVEL Left 5/15/2019    Procedure: LEFT L 5  S1 DISCECTOMY ( SHEEHAN FRAME ; ERLINDA )  (MAC ANESTHESIA);  Surgeon: Angel Vera MD;  Location:  OR     Social History     Socioeconomic History     Marital status:      Spouse name: Not on file     Number of children: 4     Years of education: Not on file     Highest education level: Not on file   Occupational History     Not on file   Social Needs     Financial resource strain: Not on file     Food insecurity     Worry: Not on file     Inability: Not on file     Transportation needs     Medical: Not on file     Non-medical: Not on file   Tobacco Use     Smoking status: Never Smoker     Smokeless tobacco: Never Used   Substance and Sexual Activity     Alcohol use: Yes     Alcohol/week: 7.0 standard drinks     Types: 7 Standard drinks or equivalent per week     Comment: 8     Drug use: No     Sexual activity: Yes     Partners: Female   Lifestyle     Physical activity     Days per week: Not on file     Minutes per session: Not on file     Stress: Not on file   Relationships     Social connections     Talks on phone: Not on file     Gets together: Not on file     Attends Hindu service: Not on file     Active member of club or organization: Not on file     Attends meetings of clubs or organizations: Not on file     Relationship status: Not on file     Intimate partner violence     Fear of current or ex partner: Not on file     Emotionally abused: Not on file     Physically abused: Not on  file     Forced sexual activity: Not on file   Other Topics Concern     Parent/sibling w/ CABG, MI or angioplasty before 65F 55M? Not Asked   Social History Narrative     Not on file     Current Outpatient Medications   Medication Sig Dispense Refill     valsartan (DIOVAN) 160 MG tablet Take 1 tablet (160 mg) by mouth daily 90 tablet 3     Allergies   Allergen Reactions     Ace Inhibitors Cough     Codeine Camsylate Rash     FAMILY HISTORY NOTED AND REVIEWED    REVIEW OF SYSTEMS: above    PHYSICAL EXAM    /82   Wt 91.6 kg (202 lb)   BMI 25.59 kg/m      Patient appears non toxic    Labs pending    ASSESSMENT:  1. Hypertension, controlled  2. Normal complete physical exam  3. Elevated sugar, follow up labs  4. Health care maintenance    PLAN:  Up to date colon  2nd shingrix at pharm  Letter with labs  Exercise, diet  Call if changes    Wyatt Loco M.D.         HPI      Video Start Time: 1:08    Vitals:  No vitals were obtained today due to virtual visit.    Physical Exam     GENERAL: Healthy, alert and no distress  EYES: Eyes grossly normal to inspection.  No discharge or erythema, or obvious scleral/conjunctival abnormalities.  RESP: No audible wheeze, cough, or visible cyanosis.  No visible retractions or increased work of breathing.    SKIN: Visible skin clear. No significant rash, abnormal pigmentation or lesions.  NEURO: Cranial nerves grossly intact.  Mentation and speech appropriate for age.  PSYCH: Mentation appears normal, affect normal/bright, judgement and insight intact, normal speech and appearance well-groomed.    Video-Visit Details    Type of service:  Video Visit    Video End Time:1:18 PM    Originating Location (pt. Location): Home    Distant Location (provider location):  [facility]     Platform used for Video Visit: Doximity          SUBJECTIVE:   CC: Wyatt Dhillon is an 60 year old male who presents for preventative health visit.     Healthy Habits:    Getting at least 3  "servings of Calcium per day:  Yes    Bi-annual eye exam:  Yes    Dental care twice a year:  Yes    Sleep apnea or symptoms of sleep apnea:  Excessive snoring and Daytime drowsiness    Diet:  Regular (no restrictions)    Frequency of exercise:  6-7 days/week    Duration of exercise:  15-30 minutes    Taking medications regularly:  Yes    Barriers to taking medications:  None    Medication side effects:  None    PHQ-2 Total Score:    Additional concerns today:  Yes            Today's PHQ-2 Score:   PHQ-2 ( 1999 Pfizer) 3/30/2020   Q1: Little interest or pleasure in doing things 0   Q2: Feeling down, depressed or hopeless 0   PHQ-2 Score 0       Abuse: Current or Past(Physical, Sexual or Emotional)- No  Do you feel safe in your environment? Yes        Social History     Tobacco Use     Smoking status: Never Smoker     Smokeless tobacco: Never Used   Substance Use Topics     Alcohol use: Yes     Alcohol/week: 7.0 standard drinks     Types: 7 Standard drinks or equivalent per week     Comment: 8     If you drink alcohol do you typically have >3 drinks per day or >7 drinks per week? No    Alcohol Use 8/6/2019   Prescreen: >3 drinks/day or >7 drinks/week? No       Last PSA:   PSA   Date Value Ref Range Status   08/19/2020 1.51 0 - 4 ug/L Final     Comment:     Assay Method:  Chemiluminescence using Siemens Vista analyzer           OBJECTIVE:   Wt 91.6 kg (202 lb)   BMI 25.59 kg/m      ASSESSMENT/PLAN:     COUNSELING:   Reviewed preventive health counseling, as reflected in patient instructions    Estimated body mass index is 25.59 kg/m  as calculated from the following:    Height as of 1/16/20: 1.892 m (6' 2.5\").    Weight as of this encounter: 91.6 kg (202 lb).          reports that he has never smoked. He has never used smokeless tobacco.      Counseling Resources:  ATP IV Guidelines  Pooled Cohorts Equation Calculator  FRAX Risk Assessment  ICSI Preventive Guidelines  Dietary Guidelines for Americans, 2010  USDA's " MyPlate  ASA Prophylaxis  Lung CA Screening    Wyatt Loco MD  [facility]

## 2021-01-15 ENCOUNTER — HEALTH MAINTENANCE LETTER (OUTPATIENT)
Age: 61
End: 2021-01-15

## 2021-03-17 ENCOUNTER — TELEPHONE (OUTPATIENT)
Dept: FAMILY MEDICINE | Facility: CLINIC | Age: 61
End: 2021-03-17

## 2021-03-17 NOTE — TELEPHONE ENCOUNTER
Patient called today.    Patient and Dr. Loco at  Clinic spoke about getting a referral for Podiatry on left foot 1st toe broken.    Would like a referral and schedule.    Please have patient contact [phone] to call to schedule after referral.    Thank you.    Central Scheduling  Lise HANDLEY

## 2021-03-17 NOTE — TELEPHONE ENCOUNTER
Please have patient seen at podiatry Dr Montaño, if urgent let me know, no referral needed    Wyatt Loco M.D.

## 2021-03-18 ENCOUNTER — ANCILLARY PROCEDURE (OUTPATIENT)
Dept: GENERAL RADIOLOGY | Facility: CLINIC | Age: 61
End: 2021-03-18
Attending: PODIATRIST
Payer: COMMERCIAL

## 2021-03-18 ENCOUNTER — OFFICE VISIT (OUTPATIENT)
Dept: PODIATRY | Facility: CLINIC | Age: 61
End: 2021-03-18
Payer: COMMERCIAL

## 2021-03-18 VITALS
BODY MASS INDEX: 26.25 KG/M2 | WEIGHT: 215.6 LBS | DIASTOLIC BLOOD PRESSURE: 86 MMHG | SYSTOLIC BLOOD PRESSURE: 152 MMHG | HEIGHT: 76 IN

## 2021-03-18 DIAGNOSIS — M19.072 ARTHRITIS OF LEFT FOOT: ICD-10-CM

## 2021-03-18 DIAGNOSIS — M79.672 LEFT FOOT PAIN: Primary | ICD-10-CM

## 2021-03-18 DIAGNOSIS — M72.2 PLANTAR FASCIITIS: ICD-10-CM

## 2021-03-18 DIAGNOSIS — M79.672 LEFT FOOT PAIN: ICD-10-CM

## 2021-03-18 DIAGNOSIS — L60.0 ONYCHOCRYPTOSIS: ICD-10-CM

## 2021-03-18 PROCEDURE — 73630 X-RAY EXAM OF FOOT: CPT | Mod: LT | Performed by: RADIOLOGY

## 2021-03-18 PROCEDURE — 99203 OFFICE O/P NEW LOW 30 MIN: CPT | Performed by: PODIATRIST

## 2021-03-18 ASSESSMENT — MIFFLIN-ST. JEOR: SCORE: 1884.22

## 2021-03-18 NOTE — PROGRESS NOTES
"Foot & Ankle Surgery  March 18, 2021    CC: \"broken toe & possible ingrown nail\"    I was asked to see Wyatt Dhillon regarding the chief complaint by:  Dr. PUMA Loco    HPI:  Pt is a 60 year old male who presents with above complaint.  Toe fracture early 2020, xrays from Jan 16, 2020 showed a fracture at the medial head of the proximal phalanx, intra-articular extension, with a step-off of the fracture fragment. \"stop hurting - plan of action\".  Stubbed toe going to bed; struck on last step.  \"acts up\" with activities, eg cross country skiing, walking.  Toenail medial L hallux.  Plantar fasciitis left.      ROS:   Pos for CC.  The patient denies current nausea, vomiting, chills, fevers, belly pain, calf pain, chest pain or SOB.  Complete remainder of ROS is otherwise neg.    VITALS:    Vitals:    03/18/21 1501   BP: (!) 152/86   Weight: 97.8 kg (215 lb 9.6 oz)   Height: 1.938 m (6' 4.3\")       PMH:    Past Medical History:   Diagnosis Date     Anal fistula 2001    s/p repair     Carpal tunnel syndrome on both sides 2012     Cervical disc herniation 1986    C5-6     Elevated blood sugar      Essential hypertension 11/2019    added med then after 24 hour bp monitor, had cough and changed from acei to diovan, edema with norvasc     Gastroenteritis     while in [place]     Hx of colonoscopy 2008, 2019    nl     Insufficient sleep syndrome 2012    sleep test done     Normal stress echocardiogram 2002     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    one year of meds     Screening for heart disease 10/2019    ebct score of 0       SXHX:    Past Surgical History:   Procedure Laterality Date     C REPAIR  ANAL FISTULA,RECT ADV FLAP  2001     COLONOSCOPY N/A 1/7/2019    Procedure: COLONOSCOPY;  Surgeon: Fco Cross MD;  Location:  GI     LAMINECTOMY LUMBAR ONE LEVEL Left 5/15/2019    Procedure: LEFT L 5  S1 DISCECTOMY ( SHEEHAN FRAME ; ERLINDA )  (MAC ANESTHESIA);  Surgeon: Angel Vera MD;  Location:  OR        MEDS:    Current " Outpatient Medications   Medication     valsartan (DIOVAN) 160 MG tablet     No current facility-administered medications for this visit.        ALL:     Allergies   Allergen Reactions     Ace Inhibitors Cough     Codeine Camsylate Rash       FMH:    Family History   Problem Relation Age of Onset     Cancer Father         skin - squamous     Hypertension Father      Diabetes Mother      Hypertension Mother      Cancer - colorectal Maternal Grandfather      Heart Disease Maternal Grandfather         MI     Arthritis Paternal Grandfather      Cerebrovascular Disease No family hx of        SocHx:    Social History     Socioeconomic History     Marital status:      Spouse name: Not on file     Number of children: 4     Years of education: Not on file     Highest education level: Not on file   Occupational History     Not on file   Social Needs     Financial resource strain: Not on file     Food insecurity     Worry: Not on file     Inability: Not on file     Transportation needs     Medical: Not on file     Non-medical: Not on file   Tobacco Use     Smoking status: Never Smoker     Smokeless tobacco: Never Used   Substance and Sexual Activity     Alcohol use: Yes     Alcohol/week: 7.0 standard drinks     Types: 7 Standard drinks or equivalent per week     Comment: 8     Drug use: No     Sexual activity: Yes     Partners: Female   Lifestyle     Physical activity     Days per week: Not on file     Minutes per session: Not on file     Stress: Not on file   Relationships     Social connections     Talks on phone: Not on file     Gets together: Not on file     Attends Sikh service: Not on file     Active member of club or organization: Not on file     Attends meetings of clubs or organizations: Not on file     Relationship status: Not on file     Intimate partner violence     Fear of current or ex partner: Not on file     Emotionally abused: Not on file     Physically abused: Not on file     Forced sexual  activity: Not on file   Other Topics Concern     Parent/sibling w/ CABG, MI or angioplasty before 65F 55M? Not Asked   Social History Narrative     Not on file           EXAMINATION:  Gen:   No apparent distress  Neuro:   A&Ox3, no deficits  Psych:    Answering questions appropriately for age and situation with normal affect  Head:    NCAT  Eye:    Visual scanning without deficit  Ear:    Response to auditory stimuli wnl  Lung:    Non-labored breathing on RA noted  Abd:    NTND per patient report  Lymph:    Neg for pitting/non-pitting edema BLE  Vasc:    Pulses palpable, CFT minimally delayed  Neuro:    Light touch sensation intact to all sensory nerve distributions without paresthesias  Derm:    Onychocryptosis medial L hallux withotu paronychia.    MSK:    L hallux IPJ painful/stiff with PROM.  Left arch painful along central band of plantar fascia  Calf:    Neg for redness, swelling or tenderness      Imaging:  xrays today - Impression:     1.  Left foot negative for acute fracture or joint malalignment.     2.  Chronic fracture deformity involving the medial condyle of the  distal epiphysis of the proximal phalanx of the left great toe  (adjacent to the PIP joint). There is bony bridging of the fracture  fragment, but a moderate amount residual lucency.     3.  Healed old fracture deformity of the proximal phalanx of the left  third toe.     4.  Scattered amount of changes in the midfoot and forefoot.    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  -I personally reviewed imaging results today, including implication in treatment options  -comfortable shoe gear  -RICE/SNAID  -discussed xr-guided steroid injection and surgical options should above plan fail to provide sufficient relief.  Call prn for injection order.      2.  Onychocryptosis medial L hallux  -discussed options  -slantback performed today, demonstrated for patient for home debridement  -consider P&A if above plan fails to provide sufficient relief    3.  Left lower extremity plantar fascial strain  -Regarding the heel pain, the Plantar Fasciitis handout was dispensed and discussed.  We talked about stretching, resting/activity modification, icing, NSAID/tylenol use as tolerated, inserts, supportive/comfortable shoes and minimizing shoeless walking.    -discussed Achilles, plantar fascial and hamstring stretches  -OTC insert information dispensed and discussed       Follow up:  prn or sooner with acute issues      Patient's medical history was reviewed today      Erik Molina DPM FACFAS FACFAOM  Podiatric Foot & Ankle Surgeon  [facility]  [phone]

## 2021-03-18 NOTE — Clinical Note
Good morning    I saw Wyatt about 4 weeks ago for the post-traumatic arthrosis in the left great toe IPJ, medial left great toe ingrown, and plantar fascial strain.  We discussed conservative options, and he'll follow up prn for further treatment.     Thanks    Erik

## 2021-03-18 NOTE — PATIENT INSTRUCTIONS
Wyatt to follow up with Primary Care provider regarding elevated blood pressure.    Thank you for choosing [facility]!    [facility] LOC[facility]  [address]   [place] [address] [place] [address]   [phone]       SET UP SURGERY: [phone]    APPOINTMENTS: [phone]    BILLING QUESTIONS: [phone]    FAX NUMBER: [phone]        Follow up: as needed    PLANTAR FASCIITIS  Plantar fasciitis is often referred to as heel spurs or heel pain. Plantar fasciitis is a very common problem that affects people of all foot shapes, age, weight and activity level. Pain may be in the arch or on the weight-bearing surface of the heel. The pain may come on without injury or identifiable cause. Pain is generally present when first getting out of bed in the morning or up from a seated break.     CAUSES  The plantar fascia is a dense fibrous band of tissue that stretches across the bottom surface of the foot. The fascia helps support the foot muscles and arch. Plantar fasciitis is thought to be caused by mechanical strain or overload. Frequent walking without shoes or wearing unsupportive shoes is thought to cause structural overload and ultimately inflammation of the plantar fascia. Some people have heel spurs that can be seen on x-ray. The heel spur is actually a minor component of plantar fascitis and is largely ignored.       SELF TREATMENT   The easiest solution is to stop walking around your home without shoes. Plantar fasciitis is largely a shoe problem. Shoes are either not being worn often enough or your current shoes are inadequate for your weight, foot structure or activity level. The majority of shoes on the market today are not sufficient to resist development of plantar fasciitis or to promote healing. Assume that your current shoes are inadequate and will need to be replaced. Even  high quality shoes wear out with 6 months to one year of frequent use. Weight loss is another option. Losing ten pounds in the next two months may be enough to resolve the problem. Ice applied to the area of pain two to three times per day for ten minutes each session can be very helpful. Warm foot soaks in epsom salts can also relieve pain. This should continue until the problem resolves. Achilles tendon stretching is essential. Stretch multiple times daily to promote healing and to prevent recurrence in the future. Over all stretching of the body is helpful as well such as the calves, thighs and lower back. Normally when one area of the body is tight, other areas are too. Gentle Yoga can be good for this.     Over the counter topical anti inflammatories can be helpful such as biofreeze, bengay, salon pas, ect...  Oral ibuprofen or aleve is recommended as well to try to calm down inflammation.     Night splints can be helpful to gradually stretch the foot at night as a lot of pain is when you get up in the morning. Taking a towel or thera band and stretching the foot back multiple times before you get ou of bed can be beneficial as well.     MEDICAL TREATMENT  Medical treatments often include custom arch supports, cortisone injections, physical therapy, splints to be worn in bed, prescription medications and surgery. The home treatments listed above will be necessary regardless of these advanced medical treatments. Surgery is rarely needed but is very helpful in selected cases.     PROGNOSIS  Plantar fasciitis can last from one day to a lifetime. Some people get intermittent fascitis that is very short-lived. Others suffer daily for years. Excessive body weight, frequent bare foot walking, long hours on the feet, inadequate shoes, predisposing foot structures and excessive activity such as running are all potential issues that lead to chronic and/or recurring plantar fascitis. Having plantar fasciitis means that  you are forever prone to this problem and will require modification of some of the above factors. Most people seek treatment within one to four months. Healing usually requires a similar one to four month time frame. Healing time is relative to the amount of effort spent treating the problem.   Plantar fasciitis is highly recurrent. Risk factors often continue, including return to bare foot walking, inadequate shoes, excessive body weight, excessive activities, etc. Your life style and foot structure may predispose you to recurrent plantar fasciitis. A daily prevention regimen can be very helpful. Ongoing use of shoe inserts, careful attention to appropriate shoes, daily Achilles stretching, etc. may prevent recurrence. Prompt attention at the earliest warning signs of heel pain can resolve the problem in as short as a few days.     EXERCISES  Stair Exercise: Step on the stairs with the ball of your foot and hold your position for at least 15 seconds, then slowly step down with the heels of your foot. You can do this daily and as often as you want.   Picking the Towel: Sit comfortably and then pick the towel up with your toes. You can use any object other than a towel as long as the material can be soft and you can pick it up with your toes.  Rolling the Bottle: Use a small ball or frozen water bottle and then roll it around with your foot.   Flex the Toes: Sit comfortably and then flex your toes by pointing it towards the floor or towards your body. This will relax and flex your foot and exercise your plantar fascia, the calf, and the Achilles tendon. The inability of the foot to stretch often causes the bunching up of the plantar fascia area leading to the pain.  Calf/Achilles Stretching: Lay on you back and raise one foot, then point your toes towards the floor. See photo below:               Hold each stretch for 10 seconds. Stretch 10 times per set, three sets per day. Morning, afternoon and evening. If your  heel pain is very severe in the morning, consider doing the first set of stretches before you get out of bed.      OVER THE COUNTER INSERT RECOMMENDATIONS  SuperFeet   Sofsole Fit Spenco   Power Step   Walk-Fit Arch Cradles     Most of these can be found at your local [employer], "[employer] or online.  **A good high quality over the counter insert should cost around $40-$50      [facility]  [address]  [phone]   [address]  [phone] [place]  [address] [facility] [address] [facility]  [phone]   [place]  [address]  [phone]   [place]  [address]  [phone] [place]  [address] [place] [address] [place]  [phone]   [place]  [address]  [phone]   [place]  [address]  [phone] [place]  [address]  [phone]     OVER THE COUNTER INSERTS    Most of these can be found at your local [employer], [employer], or online:    SuperFeet   Sofsole Fit Spenco   Power Step   Walk-Fit ([employer])  *For heel pain* Arch Cradles  *For heel pain*       ** A good high quality over the counter insert should cost around $40-$50    ** Capsulitis/Metarasalgia - try adding a metatarsal pad on your inserts or find an insert with one built in        For questions about the cost of your visit, or the cost of any procedure, lab or imaging study, please contact our CONSUMER PRICE LINE at [phone] for an estimate.  You may also contact them at [email or web address]     You will be asked to provide your name, birthdate, address, phone number, and insurance information.  You will also need to know the name of any specific test or procedure which is planned.  This often requires the CPT (common procedural terminology) code for the test or procedure.  Our clinic staff can help you get that information, if needed.    For information about how your insurance will cover your  clinic visit, please call the customer service number on your insurance card.

## 2021-03-19 ENCOUNTER — IMMUNIZATION (OUTPATIENT)
Dept: NURSING | Facility: CLINIC | Age: 61
End: 2021-03-19
Payer: COMMERCIAL

## 2021-03-19 PROCEDURE — 0001A PR COVID VAC PFIZER DIL RECON 30 MCG/0.3 ML IM: CPT

## 2021-03-19 PROCEDURE — 91300 PR COVID VAC PFIZER DIL RECON 30 MCG/0.3 ML IM: CPT

## 2021-04-09 ENCOUNTER — IMMUNIZATION (OUTPATIENT)
Dept: NURSING | Facility: CLINIC | Age: 61
End: 2021-04-09
Payer: COMMERCIAL

## 2021-04-09 PROCEDURE — 0002A PR COVID VAC PFIZER DIL RECON 30 MCG/0.3 ML IM: CPT

## 2021-04-09 PROCEDURE — 91300 PR COVID VAC PFIZER DIL RECON 30 MCG/0.3 ML IM: CPT

## 2021-05-03 ENCOUNTER — TELEPHONE (OUTPATIENT)
Dept: FAMILY MEDICINE | Facility: CLINIC | Age: 61
End: 2021-05-03

## 2021-05-03 NOTE — TELEPHONE ENCOUNTER
monitoring bp:    5/3/21 150/93 7am -  5/2/21- same    4/28/21 138/86  2 weeks ago 180/?    Advised we need better record taking-  Has been having head pressure- not an ache  has tingling in hands but thinks he has carpal tunnel and is under a lot of stress    doesn't monitor salt- but doesn't add any- has gained 10 lbs recently is at 211lb    Denies sob or chest pains/light headedness/left arm pain/sob/nausea/weakness    Ok to leave a detailed voice mail    [phone]  Klarissa BARTHOLOMEWRN BSN  [facility]  [phone]

## 2021-05-07 ENCOUNTER — VIRTUAL VISIT (OUTPATIENT)
Dept: FAMILY MEDICINE | Facility: CLINIC | Age: 61
End: 2021-05-07
Payer: COMMERCIAL

## 2021-05-07 VITALS — DIASTOLIC BLOOD PRESSURE: 75 MMHG | SYSTOLIC BLOOD PRESSURE: 130 MMHG

## 2021-05-07 DIAGNOSIS — I10 ESSENTIAL HYPERTENSION: Primary | ICD-10-CM

## 2021-05-07 PROCEDURE — 99213 OFFICE O/P EST LOW 20 MIN: CPT | Mod: 95 | Performed by: INTERNAL MEDICINE

## 2021-05-07 NOTE — PROGRESS NOTES
Wyatt is a 60 year old who is being evaluated via a billable video visit.      How would you like to obtain your AVS? Tripwolfhart  If the video visit is dropped, the invitation should be resent by: Text to cell phone: [phone]  Will anyone else be joining your video visit? No      Video Start Time: 12:35 PM        Subjective   Wyatt is a 60 year old who presents for the following health issues     This is a video visit with the patient to follow-up on his hypertension.  As noted, recently he has been checking it and it has been elevated.  He does take Diovan and takes it regularly.  Last time he was seen for a virtual visit was in August and his labs at that point were fine as noted and reviewed.  The last couple of days his blood pressure has been much better and in fact it was in the 120s over 70s today.    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  He does work out very regularly and has no problems doing that.  He does not add salt, use NSAIDs, or drink significantly.  He does have a fair amount of caffeine.    Occasionally he can have a right temporal headache.  He thinks it may be stress related.  Infrequently he can feel butterflies in his chest but never when he works out.  He can work out to a heart rate of 1 50-1 60 and has absolutely no problems.    Past Medical History:   Diagnosis Date     Anal fistula 2001    s/p repair     Carpal tunnel syndrome on both sides 2012     Cervical disc herniation 1986    C5-6     Elevated blood sugar      Essential hypertension 11/2019    added med then after 24 hour bp monitor, had cough and changed from acei to diovan, edema with norvasc     Gastroenteritis     while in [place]     Hx of colonoscopy 2008, 2019    nl     Insufficient sleep syndrome 2012    sleep test done     Normal stress echocardiogram 2002     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    one year of meds     Screening for heart disease 10/2019    ebct score of 0     Past Surgical  History:   Procedure Laterality Date     C REPAIR  ANAL FISTULA,RECT ADV FLAP  2001     COLONOSCOPY N/A 1/7/2019    Procedure: COLONOSCOPY;  Surgeon: Fco Cross MD;  Location:  GI     LAMINECTOMY LUMBAR ONE LEVEL Left 5/15/2019    Procedure: LEFT L 5  S1 DISCECTOMY ( SHEEHAN FRAME ; ERLINDA )  (MAC ANESTHESIA);  Surgeon: Angel Vera MD;  Location:  OR     Social History     Socioeconomic History     Marital status:      Spouse name: Not on file     Number of children: 4     Years of education: Not on file     Highest education level: Not on file   Occupational History     Not on file   Social Needs     Financial resource strain: Not on file     Food insecurity     Worry: Not on file     Inability: Not on file     Transportation needs     Medical: Not on file     Non-medical: Not on file   Tobacco Use     Smoking status: Never Smoker     Smokeless tobacco: Never Used   Substance and Sexual Activity     Alcohol use: Yes     Alcohol/week: 7.0 standard drinks     Types: 7 Standard drinks or equivalent per week     Comment: 8     Drug use: No     Sexual activity: Yes     Partners: Female   Lifestyle     Physical activity     Days per week: Not on file     Minutes per session: Not on file     Stress: Not on file   Relationships     Social connections     Talks on phone: Not on file     Gets together: Not on file     Attends [employer] service: Not on file     Active member of club or organization: Not on file     Attends meetings of clubs or organizations: Not on file     Relationship status: Not on file     Intimate partner violence     Fear of current or ex partner: Not on file     Emotionally abused: Not on file     Physically abused: Not on file     Forced sexual activity: Not on file   Other Topics Concern     Parent/sibling w/ CABG, MI or angioplasty before 65F 55M? Not Asked   Social History Narrative     Not on file     Current Outpatient Medications   Medication Sig Dispense Refill      valsartan (DIOVAN) 160 MG tablet Take 1 tablet (160 mg) by mouth daily 90 tablet 3     Allergies   Allergen Reactions     Ace Inhibitors Cough     Codeine Camsylate Rash     FAMILY HISTORY NOTED AND REVIEWED    REVIEW OF SYSTEMS: above    PHYSICAL EXAM    /75           Vitals:  No vitals were obtained today due to virtual visit.    Physical Exam   GENERAL: Healthy, alert and no distress  EYES: Eyes grossly normal to inspection.  No discharge or erythema, or obvious scleral/conjunctival abnormalities.  RESP: No audible wheeze, cough, or visible cyanosis.  No visible retractions or increased work of breathing.    SKIN: Visible skin clear. No significant rash, abnormal pigmentation or lesions.  NEURO: Cranial nerves grossly intact.  Mentation and speech appropriate for age.  PSYCH: Mentation appears normal, affect normal/bright, judgement and insight intact, normal speech and appearance well-groomed.    ASSESSMENT:  Hypertension, well high at times I suspect is related to stress and at other times it seems to be quite good.  Given this I do not think we need to make any changes.  He will continue his medication.  He will work on exercise,, and weight loss.  He will monitor his blood pressure 2-4 times a week after sitting and in 2 to 3 weeks will send me a MyChart update.    Wyatt Loco M.D.              Video-Visit Details    Type of service:  Video Visit    Video End Time:12:47 PM    Originating Location (pt. Location): Home    Distant Location (provider location):  [facility]     Platform used for Video Visit: Rey

## 2021-09-04 ENCOUNTER — HEALTH MAINTENANCE LETTER (OUTPATIENT)
Age: 61
End: 2021-09-04

## 2021-09-07 DIAGNOSIS — I10 ESSENTIAL HYPERTENSION: ICD-10-CM

## 2021-09-08 RX ORDER — VALSARTAN 160 MG/1
TABLET ORAL
Qty: 90 TABLET | Refills: 3 | Status: SHIPPED | OUTPATIENT
Start: 2021-09-08 | End: 2021-12-02

## 2021-09-08 NOTE — TELEPHONE ENCOUNTER
Routing refill request to provider for review/approval because:  Labs are not current (done Aug 2020)

## 2021-10-30 ENCOUNTER — HEALTH MAINTENANCE LETTER (OUTPATIENT)
Age: 61
End: 2021-10-30

## 2021-11-15 ENCOUNTER — HOSPITAL ENCOUNTER (EMERGENCY)
Facility: CLINIC | Age: 61
Discharge: HOME | End: 2021-11-15
Attending: EMERGENCY MEDICINE | Admitting: EMERGENCY MEDICINE
Payer: COMMERCIAL

## 2021-11-15 ENCOUNTER — APPOINTMENT (OUTPATIENT)
Dept: CARDIOLOGY | Facility: CLINIC | Age: 61
End: 2021-11-15
Attending: EMERGENCY MEDICINE
Payer: COMMERCIAL

## 2021-11-15 ENCOUNTER — VIRTUAL VISIT (OUTPATIENT)
Dept: FAMILY MEDICINE | Facility: CLINIC | Age: 61
End: 2021-11-15
Payer: COMMERCIAL

## 2021-11-15 VITALS
SYSTOLIC BLOOD PRESSURE: 139 MMHG | RESPIRATION RATE: 19 BRPM | DIASTOLIC BLOOD PRESSURE: 95 MMHG | HEART RATE: 72 BPM | TEMPERATURE: 98.1 F | BODY MASS INDEX: 23.79 KG/M2 | OXYGEN SATURATION: 99 % | WEIGHT: 197 LBS

## 2021-11-15 DIAGNOSIS — R00.2 PALPITATIONS: ICD-10-CM

## 2021-11-15 DIAGNOSIS — F41.9 ANXIETY: Primary | ICD-10-CM

## 2021-11-15 DIAGNOSIS — I10 BENIGN ESSENTIAL HYPERTENSION: ICD-10-CM

## 2021-11-15 LAB
ALBUMIN SERPL-MCNC: 3.7 G/DL (ref 3.4–5)
ALP SERPL-CCNC: 87 U/L (ref 40–150)
ALT SERPL W P-5'-P-CCNC: 25 U/L (ref 0–70)
ANION GAP SERPL CALCULATED.3IONS-SCNC: 6 MMOL/L (ref 3–14)
AST SERPL W P-5'-P-CCNC: 17 U/L (ref 0–45)
BASOPHILS # BLD AUTO: 0 10E3/UL (ref 0–0.2)
BASOPHILS NFR BLD AUTO: 1 %
BILIRUB SERPL-MCNC: 0.6 MG/DL (ref 0.2–1.3)
BUN SERPL-MCNC: 16 MG/DL (ref 7–30)
CALCIUM SERPL-MCNC: 9 MG/DL (ref 8.5–10.1)
CHLORIDE BLD-SCNC: 108 MMOL/L (ref 94–109)
CO2 SERPL-SCNC: 26 MMOL/L (ref 20–32)
CREAT SERPL-MCNC: 0.88 MG/DL (ref 0.66–1.25)
EOSINOPHIL # BLD AUTO: 0 10E3/UL (ref 0–0.7)
EOSINOPHIL NFR BLD AUTO: 0 %
ERYTHROCYTE [DISTWIDTH] IN BLOOD BY AUTOMATED COUNT: 12.8 % (ref 10–15)
GFR SERPL CREATININE-BSD FRML MDRD: >90 ML/MIN/1.73M2
GLUCOSE BLD-MCNC: 126 MG/DL (ref 70–99)
HCT VFR BLD AUTO: 41.9 % (ref 40–53)
HGB BLD-MCNC: 14.3 G/DL (ref 13.3–17.7)
HOLD SPECIMEN: NORMAL
IMM GRANULOCYTES # BLD: 0 10E3/UL
IMM GRANULOCYTES NFR BLD: 0 %
LYMPHOCYTES # BLD AUTO: 1.3 10E3/UL (ref 0.8–5.3)
LYMPHOCYTES NFR BLD AUTO: 26 %
MAGNESIUM SERPL-MCNC: 2.3 MG/DL (ref 1.6–2.3)
MCH RBC QN AUTO: 32.1 PG (ref 26.5–33)
MCHC RBC AUTO-ENTMCNC: 34.1 G/DL (ref 31.5–36.5)
MCV RBC AUTO: 94 FL (ref 78–100)
MONOCYTES # BLD AUTO: 0.6 10E3/UL (ref 0–1.3)
MONOCYTES NFR BLD AUTO: 11 %
NEUTROPHILS # BLD AUTO: 3.2 10E3/UL (ref 1.6–8.3)
NEUTROPHILS NFR BLD AUTO: 62 %
NRBC # BLD AUTO: 0 10E3/UL
NRBC BLD AUTO-RTO: 0 /100
NT-PROBNP SERPL-MCNC: 44 PG/ML (ref 0–900)
PLATELET # BLD AUTO: 217 10E3/UL (ref 150–450)
POTASSIUM BLD-SCNC: 4 MMOL/L (ref 3.4–5.3)
PROT SERPL-MCNC: 7.2 G/DL (ref 6.8–8.8)
RBC # BLD AUTO: 4.45 10E6/UL (ref 4.4–5.9)
SODIUM SERPL-SCNC: 140 MMOL/L (ref 133–144)
TROPONIN I SERPL-MCNC: <0.015 UG/L (ref 0–0.04)
TSH SERPL DL<=0.005 MIU/L-ACNC: 2.52 MU/L (ref 0.4–4)
WBC # BLD AUTO: 5.1 10E3/UL (ref 4–11)

## 2021-11-15 PROCEDURE — 82040 ASSAY OF SERUM ALBUMIN: CPT | Performed by: EMERGENCY MEDICINE

## 2021-11-15 PROCEDURE — 84443 ASSAY THYROID STIM HORMONE: CPT | Performed by: EMERGENCY MEDICINE

## 2021-11-15 PROCEDURE — 93225 XTRNL ECG REC<48 HRS REC: CPT

## 2021-11-15 PROCEDURE — 99284 EMERGENCY DEPT VISIT MOD MDM: CPT | Mod: 25

## 2021-11-15 PROCEDURE — 93227 XTRNL ECG REC<48 HR R&I: CPT | Performed by: INTERNAL MEDICINE

## 2021-11-15 PROCEDURE — 36415 COLL VENOUS BLD VENIPUNCTURE: CPT | Performed by: EMERGENCY MEDICINE

## 2021-11-15 PROCEDURE — 84484 ASSAY OF TROPONIN QUANT: CPT | Performed by: EMERGENCY MEDICINE

## 2021-11-15 PROCEDURE — 99213 OFFICE O/P EST LOW 20 MIN: CPT | Mod: 95 | Performed by: INTERNAL MEDICINE

## 2021-11-15 PROCEDURE — 93005 ELECTROCARDIOGRAM TRACING: CPT | Mod: 59

## 2021-11-15 PROCEDURE — 83880 ASSAY OF NATRIURETIC PEPTIDE: CPT | Performed by: EMERGENCY MEDICINE

## 2021-11-15 PROCEDURE — 83735 ASSAY OF MAGNESIUM: CPT | Performed by: EMERGENCY MEDICINE

## 2021-11-15 PROCEDURE — 85025 COMPLETE CBC W/AUTO DIFF WBC: CPT | Performed by: EMERGENCY MEDICINE

## 2021-11-15 RX ORDER — LIDOCAINE 40 MG/G
CREAM TOPICAL
Status: DISCONTINUED | OUTPATIENT
Start: 2021-11-15 | End: 2021-11-15 | Stop reason: HOSPADM

## 2021-11-15 RX ORDER — LORAZEPAM 1 MG/1
1 TABLET ORAL
Qty: 5 TABLET | Refills: 0 | Status: SHIPPED | OUTPATIENT
Start: 2021-11-15 | End: 2021-12-02

## 2021-11-15 ASSESSMENT — ENCOUNTER SYMPTOMS
FEVER: 0
NAUSEA: 0
SHORTNESS OF BREATH: 0
PALPITATIONS: 1
DIARRHEA: 0
COUGH: 0
VOMITING: 0

## 2021-11-15 NOTE — DISCHARGE INSTRUCTIONS
*You may resume diet and activities as tolerated.  Drink plenty of fluids.  Avoid caffeine and alcohol.  *No new medications.  *Follow-up with your primary doctor in 2-3 days.    *Return if you become worse in any way.      Discharge Instructions  Palpitations    Palpitations are an unusual awareness of your heartbeat. People often describe this as the heart skipping, fluttering, racing, irregular, or pounding. At this time, your doctor has found no signs that your palpitations are due to a serious or life-threatening condition. However, sometimes there is a serious problem that does not show up right away. It is important that you follow up with your doctor within 1 week, or as directed by your doctor today, to check for other serious problems. You may need more blood tests, a stress test, heart monitoring, or other tests.    Palpitations can be caused by caffeine, cigarettes, diet pills, energy drinks or supplements, other stimulants, and medications and street drugs. They can also be caused by anxiety, hormone conditions such as high thyroid, and other medical conditions. Sometimes they are a sign of abnormal rhythm in the heart, so you may need your heart checked.    Return to the Emergency Department if:  You get chest pain or tightness.  You are short of breath.  You get very weak or tired.  You pass out or faint.  Your heart rate is over 120 beats per minute for more than 10 minutes while you are resting.  You have any new symptoms, like fever, cough, numb legs, or you cough up blood.  You have anything else that worries you.    What can I do to help myself?  Fill any prescriptions the doctor gave you and take them right away.   Follow your doctor s instructions about the prescription medicines you are on. Sometimes the doctor may tell you to stop taking a medicine or change the dose.  If you smoke, this may be a good time to quit! The less you can smoke, the better.  Do not use energy drinks, diet pills, or  stimulants. Limit your use of caffeine.    Follow up with your doctor:  Within 1 week, or sooner if instructed.  If you keep having palpitations.  If you need help to quit smoking.  If you were given a prescription for medicine here today, be sure to read all of the information (including the package insert) that comes with your prescription.  This will include important information about the medicine, its side effects, and any warnings that you need to know about.  The pharmacist who fills the prescription can provide more information and answer questions you may have about the medicine.  If you have questions or concerns that the pharmacist cannot address, please call or return to the Emergency Department.         Opioid Medication Information    Pain medications are among the most commonly prescribed medicines, so we are including this information for all our patients. If you did not receive pain medication or get a prescription for pain medicine, you can ignore it.     You may have been given a prescription for an opioid (narcotic) pain medicine and/or have received a pain medicine while here in the Emergency Department. These medicines can make you drowsy or impaired. You must not drive, operate dangerous equipment, or engage in any other dangerous activities while taking these medications. If you drive while taking these medications, you could be arrested for DUI, or driving under the influence. Do not drink any alcohol while you are taking these medications.     Opioid pain medications can cause addiction. If you have a history of chemical dependency of any type, you are at a higher risk of becoming addicted to pain medications.  Only take these prescribed medications to treat your pain when all other options have been tried. Take it for as short a time and as few doses as possible. Store your pain pills in a secure place, as they are frequently stolen and provide a dangerous opportunity for children or  visitors in your house to start abusing these powerful medications. We will not replace any lost or stolen medicine.  As soon as your pain is better, you should flush all your remaining medication.     Many prescription pain medications contain Tylenol  (acetaminophen), including Vicodin , Tylenol #3 , Norco , Lortab , and Percocet .  You should not take any extra pills of Tylenol  if you are using these prescription medications or you can get very sick.  Do not ever take more than 3000 mg of acetaminophen in any 24 hour period.    All opioids tend to cause constipation. Drink plenty of water and eat foods that have a lot of fiber, such as fruits, vegetables, prune juice, apple juice and high fiber cereal.  Take a laxative if you don t move your bowels at least every other day. Miralax , Milk of Magnesia, Colace , or Senna  can be used to keep you regular.      Remember that you can always come back to the Emergency Department if you are not able to see your regular doctor in the amount of time listed above, if you get any new symptoms, or if there is anything that worries you.

## 2021-11-15 NOTE — ED TRIAGE NOTES
"Pt reports feeling \"Flutter in his chest\" for the last few hours. Pt denies any chest pain. Pt reports history of anxiety. Pt reports \" being under a lot of stress lately\". Pt reports hypertension for the last couple of days.   "

## 2021-11-15 NOTE — PROGRESS NOTES
Wyatt is a 61 year old who is being evaluated via a billable telephone visit.      What phone number would you like to be contacted at? [phone]  How would you like to obtain your AVS? Mail a copy        Subjective   Wyatt is a 61 year old who presents for the following health issues     Follow up emergency room from earlier today.      The patient has had a lot of stress recently, some related to family issues.  He has been obsessing a little bit about things because of a past relationship regarding his wife.  They had a talk last night that was good but then he did not sleep and developed palpitations and went to the emergency room.  He was evaluated thoroughly there which I reviewed including labs.  Since then he has been doing well, feels a little bit jittery but no palpitations.  He has not been having chest pain or shortness of breath or fevers.    No chest pain or shortness of breath, no palp prior and since has been fine.  He has had some issues with sleep recently as well    ASSESSMENT:  Stress and anxiety with palpitations.  I do not think this is cardiovascular.  He has the Holter which is great.  I do not think that this is cardiovascular ischemia or PE.  We will see what the results of that are.    I discussed with him counseling.  He knows a psychiatrist Wilian Weinstein MD and is looking to get in with him.    At this time I will give him lorazepam to use at bedtime as needed.  He knows to call me if he is having increased difficulties or any other symptoms.  He has a physical with me in the near future.    Wyatt Loco M.D.  12 minutes on the day of the encounter doing chart review, history and exam, documentation and further activities as noted above.

## 2021-11-15 NOTE — ED PROVIDER NOTES
"  History     Chief Complaint:  Palpitations     The history is provided by the patient.      Wyatt Dhillon is a 61 year old male with history of hypertension who presents with approximately 4.5 hours of a \"fluttering\" sensation in his chest. The palpitations have waxed and waned in intensity but have otherwise been constant. Here in the ED, the palpitations have resolved. Wyatt denies fever, nausea, vomiting, diarrhea, chest pain, cough, hemoptysis, shortness of breath, leg swelling. He denies history of blood clot, cancer. No recent surgeries but he does mention that he recently took a plane to and from [facility] earlier this month. He does not use estrogen or hormonal therapies. Denies tobacco use but does note that he drinks alcohol somewhat heavily. He additionally mentions that he drinks 3-4 caffeinated beverages per day. Wyatt does note that he has been experiencing increased anxiety over the past month, and that his anxiety typically has physical manifestations.    Review of Systems   Constitutional: Negative for fever.   Respiratory: Negative for cough and shortness of breath.         Hemoptysis -   Cardiovascular: Positive for palpitations. Negative for leg swelling.   Gastrointestinal: Negative for diarrhea, nausea and vomiting.   All other systems reviewed and are negative.    Allergies:  Ace Inhibitors  Codeine    Medications:  Valsartan     Past Medical History:    Anal fistula  Carpal tunnel syndrome, bilateral   Cervical disc herniation  Hypertension  Gastroenteritis  Insufficient sleep syndrome     Past Surgical History:    Colonoscopy   Lumbar laminectomy  Anal fistula repair    Family History:    Father: squamous cell skin cancer, hypertension  Mother: hypertension, diabetes mellitus     Social History:  Patient presents to the ED with his wife.  Patient presents to the ED via car.    Physical Exam     Patient Vitals for the past 24 hrs:   BP Temp Temp src Pulse Resp SpO2 Weight   11/15/21 " 0730 (!) 139/95 -- -- 72 -- 99 % --   11/15/21 0715 (!) 161/97 -- -- 81 19 100 % --   11/15/21 0700 (!) 158/89 -- -- 79 12 99 % --   11/15/21 0645 (!) 155/91 -- -- 80 18 100 % --   11/15/21 0539 (!) 177/96 98.1  F (36.7  C) Oral 83 16 100 % 89.4 kg (197 lb)     Physical Exam  General: Well-nourished, appears to be resting comfortably when I enter the room  Eyes: PERRL, conjunctivae pink no scleral icterus or conjunctival injection  ENT:  Moist mucus membranes, posterior oropharynx clear without erythema or exudates  Respiratory:  Lungs clear to auscultation bilaterally, no crackles/rubs/wheezes.  Good air movement  CV: Normal rate and rhythm, no murmurs/rubs/gallops  GI:  Abdomen soft and non-distended.  Normoactive BS.  No tenderness, guarding or rebound  Skin: Warm, dry.  No rashes or petechiae  Musculoskeletal: No peripheral edema or calf tenderness  Neuro: Alert and oriented to person/place/time  Psychiatric: Normal affect    Emergency Department Course     ECG  ECG taken at 0534, ECG read at 0622  Normal sinus rhythm  Possible left atrial enlargement  Borderline ECG  Rate 83 bpm. ND interval 176 ms. QRS duration 98 ms. QT/QTc 382/448 ms. P-R-T axes 81 70 77.     Laboratory:  Labs Ordered and Resulted from Time of ED Arrival to Time of ED Departure   COMPREHENSIVE METABOLIC PANEL - Abnormal       Result Value    Sodium 140      Potassium 4.0      Chloride 108      Carbon Dioxide (CO2) 26      Anion Gap 6      Urea Nitrogen 16      Creatinine 0.88      Calcium 9.0      Glucose 126 (*)     Alkaline Phosphatase 87      AST 17      ALT 25      Protein Total 7.2      Albumin 3.7      Bilirubin Total 0.6      GFR Estimate >90     MAGNESIUM - Normal    Magnesium 2.3     TROPONIN I - Normal    Troponin I <0.015     NT PROBNP INPATIENT - Normal    N terminal Pro BNP Inpatient 44     TSH WITH FREE T4 REFLEX - Normal    TSH 2.52     CBC WITH PLATELETS AND DIFFERENTIAL    WBC Count 5.1      RBC Count 4.45      Hemoglobin  14.3      Hematocrit 41.9      MCV 94      MCH 32.1      MCHC 34.1      RDW 12.8      Platelet Count 217      % Neutrophils 62      % Lymphocytes 26      % Monocytes 11      % Eosinophils 0      % Basophils 1      % Immature Granulocytes 0      NRBCs per 100 WBC 0      Absolute Neutrophils 3.2      Absolute Lymphocytes 1.3      Absolute Monocytes 0.6      Absolute Eosinophils 0.0      Absolute Basophils 0.0      Absolute Immature Granulocytes 0.0      Absolute NRBCs 0.0       Emergency Department Course:    Reviewed:  I reviewed nursing notes, vitals, past medical history, care everywhere    Assessments:  0656 I obtained history and examined the patient as noted above.   0731 I rechecked the patient and explained findings.     Disposition:  The patient was discharged to home.     Impression & Plan     Medical Decision Making:    Wyatt Dhillon is a 61 year old male presented with a sensation of palpitations.  The work up in the Emergency Department is negative, monitoring and EKG have not shown any abnormal heart rhythms or concerning morphologies.  I considered a broad differential diagnosis including acute coronary syndrome, myocardial infarction, pulmonary embolism, electrolyte abnormalities, drug reaction, anxiety, amongst others.  Electrolytes are normal and the patient is not anemic.  No EKG changes or troponin elevation concerning for acute coronary syndrome.  The patient has no chest pain, hypoxia, tachycardia, lower extremity symptoms or other risk fractors for thromboembolism and I doubt PE. A Holter monitor was ordered and placed in the ED upon discharge. No serious etiology for the palpitations were detected today during this visit and I feel the patient is safe for discharge.   Close follow up with primary care is indicated should the symptoms continue, as further work up may be performed; this was made clear to the patient, who understands.     Diagnosis:    ICD-10-CM    1. Palpitations  R00.2 Holter  Monitor 48 hour Adult Pediatric   2. Benign essential hypertension  I10        Discharge Medications:  New Prescriptions    No medications on file       Scribe Disclosure:  I, Sanjuana Hawley, am serving as a scribe at 6:22 AM on 11/15/2021 to document services personally performed by Phuong Allred MD based on my observations and the provider's statements to me.       Phuong Allred MD  11/15/21 0752

## 2021-11-18 LAB
ATRIAL RATE - MUSE: 83 BPM
DIASTOLIC BLOOD PRESSURE - MUSE: NORMAL MMHG
INTERPRETATION ECG - MUSE: NORMAL
P AXIS - MUSE: 81 DEGREES
PR INTERVAL - MUSE: 176 MS
QRS DURATION - MUSE: 98 MS
QT - MUSE: 382 MS
QTC - MUSE: 448 MS
R AXIS - MUSE: 70 DEGREES
SYSTOLIC BLOOD PRESSURE - MUSE: NORMAL MMHG
T AXIS - MUSE: 77 DEGREES
VENTRICULAR RATE- MUSE: 83 BPM

## 2021-12-02 ENCOUNTER — OFFICE VISIT (OUTPATIENT)
Dept: FAMILY MEDICINE | Facility: CLINIC | Age: 61
End: 2021-12-02
Payer: COMMERCIAL

## 2021-12-02 VITALS
BODY MASS INDEX: 23.87 KG/M2 | DIASTOLIC BLOOD PRESSURE: 86 MMHG | WEIGHT: 196 LBS | RESPIRATION RATE: 16 BRPM | HEART RATE: 86 BPM | OXYGEN SATURATION: 99 % | SYSTOLIC BLOOD PRESSURE: 136 MMHG | HEIGHT: 76 IN | TEMPERATURE: 97 F

## 2021-12-02 DIAGNOSIS — I10 ESSENTIAL HYPERTENSION: ICD-10-CM

## 2021-12-02 DIAGNOSIS — R73.9 ELEVATED BLOOD SUGAR: ICD-10-CM

## 2021-12-02 DIAGNOSIS — Z00.00 ENCOUNTER FOR ANNUAL PHYSICAL EXAM: Primary | ICD-10-CM

## 2021-12-02 DIAGNOSIS — F41.9 ANXIETY: ICD-10-CM

## 2021-12-02 DIAGNOSIS — F51.12 INSUFFICIENT SLEEP SYNDROME: ICD-10-CM

## 2021-12-02 LAB — HBA1C MFR BLD: 5.4 % (ref 0–5.6)

## 2021-12-02 PROCEDURE — 83036 HEMOGLOBIN GLYCOSYLATED A1C: CPT | Performed by: INTERNAL MEDICINE

## 2021-12-02 PROCEDURE — 80061 LIPID PANEL: CPT | Performed by: INTERNAL MEDICINE

## 2021-12-02 PROCEDURE — G0103 PSA SCREENING: HCPCS | Performed by: INTERNAL MEDICINE

## 2021-12-02 PROCEDURE — 99396 PREV VISIT EST AGE 40-64: CPT | Performed by: INTERNAL MEDICINE

## 2021-12-02 PROCEDURE — 36415 COLL VENOUS BLD VENIPUNCTURE: CPT | Performed by: INTERNAL MEDICINE

## 2021-12-02 RX ORDER — VALSARTAN 320 MG/1
320 TABLET ORAL DAILY
Qty: 90 TABLET | Refills: 3 | Status: SHIPPED | OUTPATIENT
Start: 2021-12-02 | End: 2022-11-28

## 2021-12-02 RX ORDER — LORAZEPAM 1 MG/1
1 TABLET ORAL
Qty: 10 TABLET | Refills: 0 | Status: SHIPPED | OUTPATIENT
Start: 2021-12-02 | End: 2023-01-18

## 2021-12-02 ASSESSMENT — MIFFLIN-ST. JEOR: SCORE: 1777.61

## 2021-12-02 NOTE — PATIENT INSTRUCTIONS
Cut the alcohol to no more than 7 a week.    Let me know if your blood pressure is not around 135/85 or lower    Wyatt Loco M.D.

## 2021-12-02 NOTE — PROGRESS NOTES
SUBJECTIVE:   CC: Wyatt Dhillon is an 61 year old male who presents for preventative health visit.     The patient overall is doing well but has had some issues with anxiety related to a marital matter.  He is using the lorazepam and this does help.  Its improved but he still has it.  He never saw the psychiatrist.  He has a counselor he plans to see.    His blood pressure at home has been somewhat elevated from 135 up to 155.  I did raise his Diovan dose as noted.  He does exercise regularly.  He does consume 15 drinks a week.    He otherwise is doing well without complaints.      Patient has been advised of split billing requirements and indicates understanding: Yes  Healthy Habits:    Getting at least 3 servings of Calcium per day:  Yes    Bi-annual eye exam:  Yes    Dental care twice a year:  Yes    Sleep apnea or symptoms of sleep apnea:  Daytime drowsiness    Diet:  Regular (no restrictions)    Frequency of exercise:  4-5 days/week    Duration of exercise:  45-60 minutes    Taking medications regularly:  Yes    Barriers to taking medications:  None    Medication side effects:  None    PHQ-2 Total Score:    Additional concerns today:  No              Today's PHQ-2 Score:   PHQ-2 ( 1999 [employer]) 11/15/2021   Q1: Little interest or pleasure in doing things 0   Q2: Feeling down, depressed or hopeless 0   PHQ-2 Score 0   PHQ-2 Total Score (12-17 Years)- Positive if 3 or more points; Administer PHQ-A if positive -       Abuse: Current or Past(Physical, Sexual or Emotional)- No  Do you feel safe in your environment? Yes        Social History     Tobacco Use     Smoking status: Never Smoker     Smokeless tobacco: Never Used   Substance Use Topics     Alcohol use: Yes     Alcohol/week: 7.0 standard drinks     Types: 7 Standard drinks or equivalent per week     Comment: 8     If you drink alcohol do you typically have >3 drinks per day or >7 drinks per week? No               Past Medical History:      Past Medical  History:   Diagnosis Date     Anal fistula 2001    s/p repair     Carpal tunnel syndrome on both sides 2012     Cervical disc herniation 1986    C5-6     Elevated blood sugar      Essential hypertension 11/2019    added med then after 24 hour bp monitor, had cough and changed from acei to diovan, edema with norvasc     Gastroenteritis     while in [place]     Hx of colonoscopy 2008, 2019    nl     Insufficient sleep syndrome 2012    sleep test done     Normal stress echocardiogram 2002     Palpitations 11/2021    holter pvc's and pac's     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    one year of meds     Screening for heart disease 10/2019    ebct score of 0             Past Surgical History:      Past Surgical History:   Procedure Laterality Date     C REPAIR  ANAL FISTULA,RECT ADV FLAP  2001     COLONOSCOPY N/A 1/7/2019    Procedure: COLONOSCOPY;  Surgeon: Fco Cross MD;  Location:  GI     LAMINECTOMY LUMBAR ONE LEVEL Left 5/15/2019    Procedure: LEFT L 5  S1 DISCECTOMY ( SHEEHAN FRAME ; ERLINDA )  (MAC ANESTHESIA);  Surgeon: Angel Vera MD;  Location:  OR             Social History:     Social History     Socioeconomic History     Marital status:      Spouse name: Not on file     Number of children: 4     Years of education: Not on file     Highest education level: Not on file   Occupational History     Not on file   Tobacco Use     Smoking status: Never Smoker     Smokeless tobacco: Never Used   Substance and Sexual Activity     Alcohol use: Yes     Alcohol/week: 7.0 standard drinks     Types: 7 Standard drinks or equivalent per week     Comment: 15 a week     Drug use: No     Sexual activity: Yes     Partners: Female   Other Topics Concern     Parent/sibling w/ CABG, MI or angioplasty before 65F 55M? Not Asked   Social History Narrative     Not on file     Social Determinants of Health     Financial Resource Strain: Not on file   Food Insecurity: Not on file   Transportation Needs: Not on file   Physical Activity:  "Not on file   Stress: Not on file   Social Connections: Not on file   Intimate Partner Violence: Not on file   Housing Stability: Not on file             Family History:   reviewed         Allergies:     Allergies   Allergen Reactions     Ace Inhibitors Cough     Codeine Camsylate Rash             Medications:     Current Outpatient Medications   Medication Sig Dispense Refill     LORazepam (ATIVAN) 1 MG tablet Take 1 tablet (1 mg) by mouth nightly as needed for anxiety 10 tablet 0     valsartan (DIOVAN) 320 MG tablet Take 1 tablet (320 mg) by mouth daily 90 tablet 3               Review of Systems:   The 10 point Review of Systems is negative other than noted in the HPI           Physical Exam:   Blood pressure 136/86, pulse 86, temperature 97  F (36.1  C), temperature source Tympanic, resp. rate 16, height 1.918 m (6' 3.5\"), weight 88.9 kg (196 lb), SpO2 99 %.    Exam:  Constitutional: healthy appearing, alert and in no distress  Heent: Normocephalic. Head without obvious masses or lesions. PERRLDC, EOMI. Mouth exam within normal limits: tongue, mucous membranes, posterior pharynx all normal, no lesions or abnormalities seen.  Tm's and canals within normal limits bilaterally. Neck supple, no nuchal rigidity or masses. No supraclavicular, or cervical adenopathy. Thyroid symmetric, no masses.  Cardiovascular: Regular rate and rhythm, no murmer, rub or gallops.  JVP not elevated, no edema.  Carotids within normal limits bilaterally, no bruits.  Respiratory: Normal respiratory effort.  Lungs clear, normal flow, no wheezing or crackles.  Breasts: Normal bilaterally.  No masses or lesions.  Nipples within normal limites.  No axillary lesions or nodes.  Gastrointestinal: Normal active bowel sounds.   Soft, not tender, no masses, guarding or rebound.  No hepatosplenomegaly.   Genitourinary: Rectal min bph  Musculoskeletal: extremities normal, no gross deformities noted.  Skin: no suspicious lesions or rashes   Neurologic: " Mental status within normal limits.  Speech fluent.  No gross motor abnormalities and gait intact.  Psychiatric: mentation appears normal and affect normal.         Data:   Labs reviewed from emergency room visit and others sent        Assessment:   1. Normal complete physical exam  2. Hypertension, controlled, suspect alcohol contributing and I rec cut down and monitor blood pressure  3. Elevated sugar, follow up labs  4. Insufficient sleep syndrome  5. Anxiety, to try counseling, use prn ativan  6. hcm         Plan:   Up to date colon  Letter with labs  Follow up psychologist  Cut down alcohol  Monitor blood pressure  Up to date immunizations       Wyatt Loco M.D.

## 2021-12-03 LAB
CHOLEST SERPL-MCNC: 185 MG/DL
FASTING STATUS PATIENT QL REPORTED: YES
HDLC SERPL-MCNC: 82 MG/DL
LDLC SERPL CALC-MCNC: 81 MG/DL
NONHDLC SERPL-MCNC: 103 MG/DL
PSA SERPL-MCNC: 1.55 UG/L (ref 0–4)
TRIGL SERPL-MCNC: 108 MG/DL

## 2021-12-04 NOTE — RESULT ENCOUNTER NOTE
Wyatt,    It was nice to see you.  Your labs look very good.  No signs of diabetes based on the hemoglobin a1c test, normal psa, and your cholesterol is super.  Lots of hdl or good and little ldl or bad.    If you have any questions please call me.    Wyatt

## 2021-12-19 ENCOUNTER — MYC MEDICAL ADVICE (OUTPATIENT)
Dept: FAMILY MEDICINE | Facility: CLINIC | Age: 61
End: 2021-12-19
Payer: COMMERCIAL

## 2022-01-26 ENCOUNTER — PRE VISIT (OUTPATIENT)
Dept: UROLOGY | Facility: CLINIC | Age: 62
End: 2022-01-26
Payer: COMMERCIAL

## 2022-01-26 NOTE — TELEPHONE ENCOUNTER
MEDICAL RECORDS REQUEST   [facility]  Urology Clinic  [address] [place] [address]  PHONE: [phone]  Fax: [phone]        FUTURE VISIT INFORMATION                                                   Wyatt Dhillon, : 1960 scheduled for future visit at [facility]    APPOINTMENT INFORMATION:    Date: 2022    Provider:  Suzanne Galarza PA    Reason for Visit/Diagnosis: ED    REFERRAL INFORMATION:    Referring provider:  N/A    Specialty: N/A    Referring providers clinic:  N/A    Clinic contact number:  N/A    RECORDS REQUESTED FOR VISIT                                                     NOTES  STATUS/DETAILS   OFFICE NOTE from referring provider  no   OFFICE NOTE from other specialist  no   DISCHARGE SUMMARY from hospital  no   DISCHARGE REPORT from the ER  no   OPERATIVE REPORT  no   MEDICATION LIST  yes   LABS     URINALYSIS (UA)  yes   URINE CYTOLOGY  no     PRE-VISIT CHECKLIST      Record collection complete Yes   Appointment appropriately scheduled           (right time/right provider) Yes   Joint diagnostic appointment coordinated correctly          (ensure right order & amount of time) Yes   MyChart activation Yes   Questionnaire complete If no, please explain pending

## 2022-01-27 ENCOUNTER — VIRTUAL VISIT (OUTPATIENT)
Dept: UROLOGY | Facility: CLINIC | Age: 62
End: 2022-01-27
Payer: COMMERCIAL

## 2022-01-27 DIAGNOSIS — N52.9 ERECTILE DYSFUNCTION, UNSPECIFIED ERECTILE DYSFUNCTION TYPE: Primary | ICD-10-CM

## 2022-01-27 DIAGNOSIS — F52.4 PREMATURE EJACULATION: ICD-10-CM

## 2022-01-27 PROCEDURE — 99203 OFFICE O/P NEW LOW 30 MIN: CPT | Mod: 95 | Performed by: PHYSICIAN ASSISTANT

## 2022-01-27 RX ORDER — TADALAFIL 5 MG/1
5 TABLET ORAL DAILY PRN
Qty: 10 TABLET | Refills: 0 | Status: SHIPPED | OUTPATIENT
Start: 2022-01-27 | End: 2022-03-02

## 2022-01-27 RX ORDER — IBUPROFEN 600 MG/1
600 TABLET, FILM COATED ORAL
COMMUNITY
End: 2023-01-18

## 2022-01-27 NOTE — PATIENT INSTRUCTIONS
UROLOGY CLINIC VISIT PATIENT INSTRUCTIONS    - Prescription sent for Cialis 5 mg daily as needed for erectile dysfunction.  May increase the dose as needed, with maximum dose 20 mg within a 24 hour period.   - Follow up with me if the Cialis is ineffective for the premature ejaculation or erectile dysfunction.     If you have any issues, questions or concerns in the meantime, do not hesitate to contact us at [phone] or via [employer].     It was a pleasure meeting with you today.  Thank you for allowing me and my team the privilege of caring for you today.  YOU are the reason we are here, and I truly hope we provided you with the excellent service you deserve.  Please let us know if there is anything else we can do for you so that we can be sure you are leaving completely satisfied with your care experience.    Suzanne Galarza PA-C  Department of Urology

## 2022-01-27 NOTE — LETTER
1/27/2022       RE: Wyatt Dhillon  [address] [facility] [address]     Dear Colleague,    Thank you for referring your patient, Wyatt Dhillon, to the [facility] [place] [facility]. Please see a copy of my visit note below.          Chief Complaint:   Erectile dysfunction         History of Present Illness:   Wyatt Dhillon is a 61 year old male with a history of hypertension who presents for evaluation of erectile dysfunction.  Patient reports he is able to achieve an erection, but is it not as strong as it used to be and does not last as long as it used to.  He also reports premature ejaculation, sometimes to the extreme of 10 seconds.  He reports this has been ongoing for a longer time than the ED.  He also reports a curvature of his erection, which he states is not a new finding and has been present since he was an adolescent.  He does report as a child he would push his erection down.  The curvature does not impair sexual intercourse and he is still able to have penetrative intercourse without issue.  He has tried a sample of Viagra in the past, but he reports he wasn't having ED issues at the time so he didn't notice any difference.  He does port side effects of feeling as though he had a hang over after taking the Viagra.           Past Medical History:     Past Medical History:   Diagnosis Date     Anal fistula 2001    s/p repair     Carpal tunnel syndrome on both sides 2012     Cervical disc herniation 1986    C5-6     Elevated blood sugar      Essential hypertension 11/2019    added med then after 24 hour bp monitor, had cough and changed from acei to diovan, edema with norvasc     Gastroenteritis     while in [place]     Hx of colonoscopy 2008, 2019    nl     Insufficient sleep syndrome 2012    sleep test done     Normal stress echocardiogram 2002     Palpitations 11/2021    holter pvc's and pac's     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     one year of meds     Screening for heart disease 10/2019    ebct score of 0            Past Surgical History:     Past Surgical History:   Procedure Laterality Date     COLONOSCOPY N/A 1/7/2019    Procedure: COLONOSCOPY;  Surgeon: Fco Cross MD;  Location: [facility]     LAMINECTOMY LUMBAR ONE LEVEL Left 5/15/2019    Procedure: LEFT L 5  S1 DISCECTOMY ( SHEEHAN FRAME ; ERLINDA )  (MAC ANESTHESIA);  Surgeon: Angel Vera MD;  Location: [facility] OR     ZZC REPAIR  ANAL FISTULA,RECT ADV FLAP  2001            Medications     Current Outpatient Medications   Medication     LORazepam (ATIVAN) 1 MG tablet     valsartan (DIOVAN) 320 MG tablet     No current facility-administered medications for this visit.            Family History:     Family History   Problem Relation Age of Onset     Cancer Father         skin - squamous     Hypertension Father      Diabetes Mother      Hypertension Mother      Cancer - colorectal Maternal Grandfather      Heart Disease Maternal Grandfather         MI     Arthritis Paternal Grandfather      Cerebrovascular Disease No family hx of             Social History:     Social History     Socioeconomic History     Marital status:      Spouse name: Not on file     Number of children: 4     Years of education: Not on file     Highest education level: Not on file   Occupational History     Not on file   Tobacco Use     Smoking status: Never Smoker     Smokeless tobacco: Never Used   Substance and Sexual Activity     Alcohol use: Yes     Alcohol/week: 7.0 standard drinks     Types: 7 Standard drinks or equivalent per week     Comment: 15 a week     Drug use: No     Sexual activity: Yes     Partners: Female   Other Topics Concern     Parent/sibling w/ CABG, MI or angioplasty before 65F 55M? Not Asked   Social History Narrative     Not on file     Social Determinants of Health     Financial Resource Strain: Not on file   Food Insecurity: Not on file   Transportation Needs: Not on file    Physical Activity: Not on file   Stress: Not on file   Social Connections: Not on file   Intimate Partner Violence: Not on file   Housing Stability: Not on file            Allergies:   Ace inhibitors and Codeine camsylate         Review of Systems:  From intake questionnaire   Negative 14 system review except as noted on HPI, nurse's note.         Physical Exam:   Patient is a 61 year old  male    General Appearance Adult: Alert, no acute distress, oriented  Lungs: no respiratory distress, or pursed lip breathing  Heart: No obvious jugular venous distension present  Skin: no suspicious lesions or rashes  Neuro: Alert, oriented, speech and mentation normal  Further examination is deferred due to the nature of our visit.        Labs and Pathology:    I personally reviewed all applicable laboratory data and went over findings with patient  Significant for:    CBC RESULTS:  Recent Labs   Lab Test 11/15/21  0642 08/19/20  1113 08/06/19  0854 05/13/19  1009   WBC 5.1 4.0 4.6 4.7   HGB 14.3 14.8 14.1 14.6    181 184 183        BMP RESULTS:  Recent Labs   Lab Test 11/15/21  0642 08/19/20  1113 08/06/19  0854 05/13/19  1009 03/22/18  0928    138 141 139 136   POTASSIUM 4.0 4.6 4.2 4.7 3.9   CHLORIDE 108 107 108 105 102   CO2 26 27 27 31 31   ANIONGAP 6 4 6 3 3   * 108* 112* 100* 102*   BUN 16 28 16 22 18   CR 0.88 0.94 0.94 0.97 0.98   GFRESTIMATED >90 86 87 84 78   GFRESTBLACK  --  >90 >90 >90 >90   BARRON 9.0 9.3 9.0 9.2 9.1       UA RESULTS:   Recent Labs   Lab Test 10/21/19  0943   SG <=1.005   URINEPH 5.5   NITRITE Negative       PSA RESULTS  PSA   Date Value Ref Range Status   08/19/2020 1.51 0 - 4 ug/L Final     Comment:     Assay Method:  Chemiluminescence using Siemens Vista analyzer   08/06/2019 1.34 0 - 4 ug/L Final     Comment:     Assay Method:  Chemiluminescence using Siemens Vista analyzer   03/22/2018 1.17 0 - 4 ug/L Final     Comment:     Assay Method:  Chemiluminescence using Siemens  "Vista analyzer   08/23/2016 0.96 0 - 4 ug/L Final     Comment:     Assay Method:  Chemiluminescence using Siemens Vista analyzer   08/10/2015 1.14 0 - 4 ug/L Final   06/13/2014 1.21 0 - 4 ug/L Final   03/27/2012 1.36 0 - 4 ug/L Final     Prostate Specific Antigen Screen   Date Value Ref Range Status   12/02/2021 1.55 0.00 - 4.00 ug/L Final         Imaging:    I personally reviewed all applicable imaging and went over findings with patient.  Significant for: no recent relevant imaging         Assessment and Plan:     Assessment: 61 year old male with ED and premature ejaculation.  Patient has tried Viagra in the past with no improvement and side effects of feeling \"hung over\" the following day.  We discussed trying an alternative PDE5 inhibitor with Cialis, and patient in agreement.  We reviewed benefits and adverse effects of Cialis.  Patient with a history of hypertension, controlled on valsartan, with no history of heart disease or nitroglycerin use.  We discussed the Cialis can be effective for premature ejaculation if associated with ED, but he was advised to follow up if the premature ejaculation persists despite adequate treatment of ED.  Could consider referral to Dr. Sood for further management of premature ejaculation at that time or consideration of SSRI.  In regards to the curvature of his penis with erection, suspect possible Peyronie's disease given previous trauma as a youth with pushing on his erection.  Patient denies any pain with erection and is not bothered by the curvature, and wishes to continue to monitor symptoms at this time.      Plan:  - Prescription sent for Cialis 5 mg daily as needed for erectile dysfunction.  May increase the dose as needed, with maximum dose 20 mg within a 24 hour period.   - Follow up with me if the Cialis is ineffective for the premature ejaculation or erectile dysfunction.       RESHMA Whitaker  Department of Urology     Wyatt is a 61 year old who is being " evaluated via a billable video visit.      How would you like to obtain your AVS? [employer] ([employer])  If the video visit is dropped, the invitation should be resent by: Send to e-mail at: [email or web address]  Will anyone else be joining your video visit? No      Video Start Time: 9:02 AM  Video-Visit Details    Type of service:  Video Visit    Video End Time:9:18 AM    Originating Location (pt. Location): Home    Distant Location (provider location):  [facility] [place]     Platform used for Video Visit: [employer]

## 2022-01-27 NOTE — PROGRESS NOTES
Chief Complaint:   Erectile dysfunction         History of Present Illness:   Wyatt Dhillon is a 61 year old male with a history of hypertension who presents for evaluation of erectile dysfunction.  Patient reports he is able to achieve an erection, but is it not as strong as it used to be and does not last as long as it used to.  He also reports premature ejaculation, sometimes to the extreme of 10 seconds.  He reports this has been ongoing for a longer time than the ED.  He also reports a curvature of his erection, which he states is not a new finding and has been present since he was an adolescent.  He does report as a child he would push his erection down.  The curvature does not impair sexual intercourse and he is still able to have penetrative intercourse without issue.  He has tried a sample of Viagra in the past, but he reports he wasn't having ED issues at the time so he didn't notice any difference.  He does port side effects of feeling as though he had a hang over after taking the Viagra.           Past Medical History:     Past Medical History:   Diagnosis Date     Anal fistula 2001    s/p repair     Carpal tunnel syndrome on both sides 2012     Cervical disc herniation 1986    C5-6     Elevated blood sugar      Essential hypertension 11/2019    added med then after 24 hour bp monitor, had cough and changed from acei to diovan, edema with norvasc     Gastroenteritis     while in [place]     Hx of colonoscopy 2008, 2019    nl     Insufficient sleep syndrome 2012    sleep test done     Normal stress echocardiogram 2002     Palpitations 11/2021    holter pvc's and pac's     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    one year of meds     Screening for heart disease 10/2019    ebct score of 0            Past Surgical History:     Past Surgical History:   Procedure Laterality Date     COLONOSCOPY N/A 1/7/2019    Procedure: COLONOSCOPY;  Surgeon: Fco Cross MD;  Location:  GI     LAMINECTOMY LUMBAR ONE LEVEL Left  5/15/2019    Procedure: LEFT L 5  S1 DISCECTOMY ( SHEEHAN FRAME ; ERLINDA )  (MAC ANESTHESIA);  Surgeon: Angel Vera MD;  Location:  OR     ZC REPAIR  ANAL FISTULA,RECT ADV FLAP  2001            Medications     Current Outpatient Medications   Medication     LORazepam (ATIVAN) 1 MG tablet     valsartan (DIOVAN) 320 MG tablet     No current facility-administered medications for this visit.            Family History:     Family History   Problem Relation Age of Onset     Cancer Father         skin - squamous     Hypertension Father      Diabetes Mother      Hypertension Mother      Cancer - colorectal Maternal Grandfather      Heart Disease Maternal Grandfather         MI     Arthritis Paternal Grandfather      Cerebrovascular Disease No family hx of             Social History:     Social History     Socioeconomic History     Marital status:      Spouse name: Not on file     Number of children: 4     Years of education: Not on file     Highest education level: Not on file   Occupational History     Not on file   Tobacco Use     Smoking status: Never Smoker     Smokeless tobacco: Never Used   Substance and Sexual Activity     Alcohol use: Yes     Alcohol/week: 7.0 standard drinks     Types: 7 Standard drinks or equivalent per week     Comment: 15 a week     Drug use: No     Sexual activity: Yes     Partners: Female   Other Topics Concern     Parent/sibling w/ CABG, MI or angioplasty before 65F 55M? Not Asked   Social History Narrative     Not on file     Social Determinants of Health     Financial Resource Strain: Not on file   Food Insecurity: Not on file   Transportation Needs: Not on file   Physical Activity: Not on file   Stress: Not on file   Social Connections: Not on file   Intimate Partner Violence: Not on file   Housing Stability: Not on file            Allergies:   Ace inhibitors and Codeine camsylate         Review of Systems:  From intake questionnaire   Negative 14 system review except as  noted on HPI, nurse's note.         Physical Exam:   Patient is a 61 year old  male    General Appearance Adult: Alert, no acute distress, oriented  Lungs: no respiratory distress, or pursed lip breathing  Heart: No obvious jugular venous distension present  Skin: no suspicious lesions or rashes  Neuro: Alert, oriented, speech and mentation normal  Further examination is deferred due to the nature of our visit.        Labs and Pathology:    I personally reviewed all applicable laboratory data and went over findings with patient  Significant for:    CBC RESULTS:  Recent Labs   Lab Test 11/15/21  0642 08/19/20  1113 08/06/19  0854 05/13/19  1009   WBC 5.1 4.0 4.6 4.7   HGB 14.3 14.8 14.1 14.6    181 184 183        BMP RESULTS:  Recent Labs   Lab Test 11/15/21  0642 08/19/20  1113 08/06/19  0854 05/13/19  1009 03/22/18  0928    138 141 139 136   POTASSIUM 4.0 4.6 4.2 4.7 3.9   CHLORIDE 108 107 108 105 102   CO2 26 27 27 31 31   ANIONGAP 6 4 6 3 3   * 108* 112* 100* 102*   BUN 16 28 16 22 18   CR 0.88 0.94 0.94 0.97 0.98   GFRESTIMATED >90 86 87 84 78   GFRESTBLACK  --  >90 >90 >90 >90   BARRON 9.0 9.3 9.0 9.2 9.1       UA RESULTS:   Recent Labs   Lab Test 10/21/19  0943   SG <=1.005   URINEPH 5.5   NITRITE Negative       PSA RESULTS  PSA   Date Value Ref Range Status   08/19/2020 1.51 0 - 4 ug/L Final     Comment:     Assay Method:  Chemiluminescence using Siemens Vista analyzer   08/06/2019 1.34 0 - 4 ug/L Final     Comment:     Assay Method:  Chemiluminescence using Siemens Vista analyzer   03/22/2018 1.17 0 - 4 ug/L Final     Comment:     Assay Method:  Chemiluminescence using Siemens Vista analyzer   08/23/2016 0.96 0 - 4 ug/L Final     Comment:     Assay Method:  Chemiluminescence using Siemens Vista analyzer   08/10/2015 1.14 0 - 4 ug/L Final   06/13/2014 1.21 0 - 4 ug/L Final   03/27/2012 1.36 0 - 4 ug/L Final     Prostate Specific Antigen Screen   Date Value Ref Range Status   12/02/2021 1.55  "0.00 - 4.00 ug/L Final         Imaging:    I personally reviewed all applicable imaging and went over findings with patient.  Significant for: no recent relevant imaging         Assessment and Plan:     Assessment: 61 year old male with ED and premature ejaculation.  Patient has tried Viagra in the past with no improvement and side effects of feeling \"hung over\" the following day.  We discussed trying an alternative PDE5 inhibitor with Cialis, and patient in agreement.  We reviewed benefits and adverse effects of Cialis.  Patient with a history of hypertension, controlled on valsartan, with no history of heart disease or nitroglycerin use.  We discussed the Cialis can be effective for premature ejaculation if associated with ED, but he was advised to follow up if the premature ejaculation persists despite adequate treatment of ED.  Could consider referral to Dr. Sood for further management of premature ejaculation at that time or consideration of SSRI.  In regards to the curvature of his penis with erection, suspect possible Peyronie's disease given previous trauma as a youth with pushing on his erection.  Patient denies any pain with erection and is not bothered by the curvature, and wishes to continue to monitor symptoms at this time.      Plan:  - Prescription sent for Cialis 5 mg daily as needed for erectile dysfunction.  May increase the dose as needed, with maximum dose 20 mg within a 24 hour period.   - Follow up with me if the Cialis is ineffective for the premature ejaculation or erectile dysfunction.       RESHMA Whitaker  Department of Urology   "

## 2022-01-27 NOTE — PROGRESS NOTES
Wyatt is a 61 year old who is being evaluated via a billable video visit.      How would you like to obtain your AVS? [email or web address]  If the video visit is dropped, the invitation should be resent by: Send to e-mail at: [email or web address]  Will anyone else be joining your video visit? No      Video Start Time: 9:02 AM  Video-Visit Details    Type of service:  Video Visit    Video End Time:9:18 AM    Originating Location (pt. Location): Home    Distant Location (provider location):  [facility]     Platform used for Video Visit: [email or web address]

## 2022-02-03 ENCOUNTER — OFFICE VISIT (OUTPATIENT)
Dept: URGENT CARE | Facility: URGENT CARE | Age: 62
End: 2022-02-03
Payer: COMMERCIAL

## 2022-02-03 VITALS
OXYGEN SATURATION: 97 % | SYSTOLIC BLOOD PRESSURE: 158 MMHG | HEART RATE: 64 BPM | DIASTOLIC BLOOD PRESSURE: 96 MMHG | TEMPERATURE: 97 F

## 2022-02-03 DIAGNOSIS — J02.9 SORE THROAT: Primary | ICD-10-CM

## 2022-02-03 LAB — DEPRECATED S PYO AG THROAT QL EIA: NEGATIVE

## 2022-02-03 PROCEDURE — 99213 OFFICE O/P EST LOW 20 MIN: CPT | Performed by: PHYSICIAN ASSISTANT

## 2022-02-03 PROCEDURE — 87651 STREP A DNA AMP PROBE: CPT | Performed by: PHYSICIAN ASSISTANT

## 2022-02-03 ASSESSMENT — ENCOUNTER SYMPTOMS
FATIGUE: 1
SORE THROAT: 1
FEVER: 0

## 2022-02-04 LAB — GROUP A STREP BY PCR: NOT DETECTED

## 2022-02-04 NOTE — PATIENT INSTRUCTIONS
Patient Education     Self-Care for Sore Throats     Sore throats happen for many reasons, such as colds, allergies, cigarette smoke, air pollution, and infections caused by viruses or bacteria. In any case, your throat becomes red and sore. Your goal for self-care is to reduce your discomfort while giving your throat a chance to heal.  Moisten and soothe your throat  Tips include the following:    Try a sip of water first thing after waking up.    Keep your throat moist by drinking 6 or more glasses of clear liquids every day.    Run a cool-air humidifier in your room overnight.    Stay away from cigarette smoke.     Check the air quality index,if air pollution gives you a sore throat. On high pollution days, try to limit outdoor time.    Suck on throat lozenges, cough drops, hard candy, ice chips, or frozen fruit-juice bars. Use the sugar-free versions if your diet or medical condition requires them.  Gargle to ease irritation  Gargling every hour or 2 can ease irritation. Try gargling with 1 of these solutions:    1/4 teaspoon of salt in 1/2 cup of warm water    An over-the-counter anesthetic gargle  Use medicine for more relief  Over-the-counter medicine can reduce sore throat symptoms. Ask your pharmacist if you have questions about which medicine to use. To prevent possible medicine interactions, let the pharmacist know what medicines you take. To decrease symptoms:    Ease pain with anesthetic sprays. Aspirin or an aspirin substitute also helps. Remember, never give aspirin to anyone 18 or younger. Don't take aspirin if you are already taking blood thinners.     For sore throats caused by allergies, try antihistamines to block the allergic reaction.  Unless a sore throat is caused by a bacterial infection, antibiotics won t help you.  Prevent future sore throats  Prevention tips include:    Stop smoking or reduce contact with secondhand smoke. Smoke irritates the tender throat lining.    Limit contact with  pets and with allergy-causing substances, such as pollen and mold.    Wash your hands often when you re around someone with a sore throat or cold. This will keep viruses or bacteria from spreading.    Limit outdoor time when air pollution is bad.    Don t strain your vocal cords.  When to call your healthcare provider  Contact your healthcare provider if you have:    Fever of 100.4 F (38.0 C) or higher, or as directed by your healthcare provider    White spots on the throat    Great Trouble swallowing    A skin rash    Recent exposure to someone else with strep bacteria    Severe hoarseness and swollen glands in the neck or jaw  Call 911  Call 911 if any of the following occur:    Trouble breathing or catching your breath    Drooling and problems swallowing    Wheezing    Unable to talk    Feeling dizzy or faint    Feeling of doom  [employer] last reviewed this educational content on 9/1/2019 2000-2021 [employer]. All rights reserved. This information is not intended as a substitute for professional medical care. Always follow your healthcare professional's instructions.

## 2022-02-04 NOTE — PROGRESS NOTES
SUBJECTIVE:   Wyatt Dhillon is a 61 year old male presenting with a chief complaint of   Chief Complaint   Patient presents with     Urgent Care     Pharyngitis     x 30 days        He is an established patient of [facility].  Patient has recently had covid and has had a ST since then (30 days).  His wife told him that his breath smells like strep and is here for a test.  No other symptoms.  Last strep was in his 20's, and used to have quite frequently as a child.      Treatment:  Nothing.        Review of Systems   Constitutional: Positive for fatigue. Negative for fever.   HENT: Positive for ear pain and sore throat.    All other systems reviewed and are negative.      Past Medical History:   Diagnosis Date     Anal fistula 2001    s/p repair     Carpal tunnel syndrome on both sides 2012     Cervical disc herniation 1986    C5-6     Elevated blood sugar      Essential hypertension 11/2019    added med then after 24 hour bp monitor, had cough and changed from acei to diovan, edema with norvasc     Gastroenteritis     while in [place]     Hx of colonoscopy 2008, 2019    nl     Insufficient sleep syndrome 2012    sleep test done     Normal stress echocardiogram 2002     Palpitations 11/2021    holter pvc's and pac's     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    one year of meds     Screening for heart disease 10/2019    ebct score of 0     Family History   Problem Relation Age of Onset     Cancer Father         skin - squamous     Hypertension Father      Diabetes Mother      Hypertension Mother      Cancer - colorectal Maternal Grandfather      Heart Disease Maternal Grandfather         MI     Arthritis Paternal Grandfather      Cerebrovascular Disease No family hx of      Current Outpatient Medications   Medication Sig Dispense Refill     ibuprofen (ADVIL/MOTRIN) 600 MG tablet Take 600 mg by mouth       LORazepam (ATIVAN) 1 MG tablet Take 1 tablet (1 mg) by mouth nightly as needed for anxiety 10 tablet 0     tadalafil (CIALIS) 5 MG  tablet Take 1 tablet (5 mg) by mouth daily as needed (erectile dysfunction.) May increase the dose as needed with maximum dose 4 tablets (20 mg) within a 24 hour period. 10 tablet 0     valsartan (DIOVAN) 320 MG tablet Take 1 tablet (320 mg) by mouth daily 90 tablet 3     Social History     Tobacco Use     Smoking status: Never Smoker     Smokeless tobacco: Never Used   Substance Use Topics     Alcohol use: Yes     Alcohol/week: 7.0 standard drinks     Types: 7 Standard drinks or equivalent per week     Comment: 15 a week       OBJECTIVE  BP (!) 158/96 (BP Location: Left arm, Patient Position: Sitting, Cuff Size: Adult Regular)   Pulse 64   Temp 97  F (36.1  C) (Oral)   SpO2 97%     Physical Exam  Vitals and nursing note reviewed.   Constitutional:       Appearance: Normal appearance. He is normal weight.   HENT:      Head: Normocephalic and atraumatic.      Right Ear: Tympanic membrane, ear canal and external ear normal.      Left Ear: Tympanic membrane, ear canal and external ear normal.      Nose: Nose normal.      Mouth/Throat:      Mouth: Mucous membranes are moist.      Pharynx: Oropharynx is clear. Posterior oropharyngeal erythema present.   Eyes:      Extraocular Movements: Extraocular movements intact.      Conjunctiva/sclera: Conjunctivae normal.   Cardiovascular:      Rate and Rhythm: Normal rate and regular rhythm.      Pulses: Normal pulses.      Heart sounds: Normal heart sounds.   Pulmonary:      Effort: Pulmonary effort is normal.      Breath sounds: Normal breath sounds.   Musculoskeletal:      Cervical back: Normal range of motion and neck supple. No muscular tenderness.   Skin:     General: Skin is warm and dry.   Neurological:      General: No focal deficit present.      Mental Status: He is alert.   Psychiatric:         Mood and Affect: Mood normal.         Behavior: Behavior normal.         Labs:  Results for orders placed or performed in visit on 02/03/22 (from the past 24 hour(s))    Streptococcus A Rapid Screen w/Reflex to PCR - Clinic Collect    Specimen: Throat; Swab   Result Value Ref Range    Group A Strep antigen Negative Negative       X-Ray was not done.    ASSESSMENT:      ICD-10-CM    1. Sore throat  J02.9 Streptococcus A Rapid Screen w/Reflex to PCR - Clinic Collect     Group A Streptococcus PCR Throat Swab        Medical Decision Making:    Differential Diagnosis:  URI Adult/Peds:  Strep pharyngitis and Viral pharyngitis    Serious Comorbid Conditions:  Adult:  reviewed    PLAN:    Tylenol/motrin as needed.  Drink plenty of water.  Gargle with salt water.  May use chloraseptic spray as directed for ST.  Strep pcr pending.      Followup:    If not improving or if condition worsens, follow up with your Primary Care Provider, If not improving or if conditions worsens over the next 12-24 hours, go to the Emergency Department    Patient Instructions     Patient Education     Self-Care for Sore Throats     Sore throats happen for many reasons, such as colds, allergies, cigarette smoke, air pollution, and infections caused by viruses or bacteria. In any case, your throat becomes red and sore. Your goal for self-care is to reduce your discomfort while giving your throat a chance to heal.  Moisten and soothe your throat  Tips include the following:    Try a sip of water first thing after waking up.    Keep your throat moist by drinking 6 or more glasses of clear liquids every day.    Run a cool-air humidifier in your room overnight.    Stay away from cigarette smoke.     Check the air quality index,if air pollution gives you a sore throat. On high pollution days, try to limit outdoor time.    Suck on throat lozenges, cough drops, hard candy, ice chips, or frozen fruit-juice bars. Use the sugar-free versions if your diet or medical condition requires them.  Gargle to ease irritation  Gargling every hour or 2 can ease irritation. Try gargling with 1 of these solutions:    1/4 teaspoon of salt  in 1/2 cup of warm water    An over-the-counter anesthetic gargle  Use medicine for more relief  Over-the-counter medicine can reduce sore throat symptoms. Ask your pharmacist if you have questions about which medicine to use. To prevent possible medicine interactions, let the pharmacist know what medicines you take. To decrease symptoms:    Ease pain with anesthetic sprays. Aspirin or an aspirin substitute also helps. Remember, never give aspirin to anyone 18 or younger. Don't take aspirin if you are already taking blood thinners.     For sore throats caused by allergies, try antihistamines to block the allergic reaction.  Unless a sore throat is caused by a bacterial infection, antibiotics won t help you.  Prevent future sore throats  Prevention tips include:    Stop smoking or reduce contact with secondhand smoke. Smoke irritates the tender throat lining.    Limit contact with pets and with allergy-causing substances, such as pollen and mold.    Wash your hands often when you re around someone with a sore throat or cold. This will keep viruses or bacteria from spreading.    Limit outdoor time when air pollution is bad.    Don t strain your vocal cords.  When to call your healthcare provider  Contact your healthcare provider if you have:    Fever of 100.4 F (38.0 C) or higher, or as directed by your healthcare provider    White spots on the throat    Great Trouble swallowing    A skin rash    Recent exposure to someone else with strep bacteria    Severe hoarseness and swollen glands in the neck or jaw  Call 911  Call 911 if any of the following occur:    Trouble breathing or catching your breath    Drooling and problems swallowing    Wheezing    Unable to talk    Feeling dizzy or faint    Feeling of doom  [employer] last reviewed this educational content on 9/1/2019 2000-2021 [employer]. All rights reserved. This information is not intended as a substitute for professional medical care. Always  follow your healthcare professional's instructions.

## 2022-02-25 ENCOUNTER — MYC MEDICAL ADVICE (OUTPATIENT)
Dept: UROLOGY | Facility: CLINIC | Age: 62
End: 2022-02-25
Payer: COMMERCIAL

## 2022-02-25 DIAGNOSIS — F52.4 PREMATURE EJACULATION: ICD-10-CM

## 2022-02-25 DIAGNOSIS — N52.9 ERECTILE DYSFUNCTION, UNSPECIFIED ERECTILE DYSFUNCTION TYPE: ICD-10-CM

## 2022-03-02 RX ORDER — TADALAFIL 5 MG/1
5 TABLET ORAL DAILY PRN
Qty: 20 TABLET | Refills: 0 | Status: SHIPPED | OUTPATIENT
Start: 2022-03-02 | End: 2023-01-18

## 2022-03-02 NOTE — PROGRESS NOTES
Signed Prescriptions:                        Disp   Refills    tadalafil (CIALIS) 5 MG tablet             20 tab*0        Sig: Take 1 tablet (5 mg) by mouth daily as needed           (erectile dysfunction.) May increase the dose as           needed with maximum dose 4 tablets (20 mg) within           a 24 hour period.  Authorizing Provider: ALIA RILEY  Ordering User: UMESH BLAKE

## 2022-04-19 ENCOUNTER — MYC MEDICAL ADVICE (OUTPATIENT)
Dept: FAMILY MEDICINE | Facility: CLINIC | Age: 62
End: 2022-04-19
Payer: COMMERCIAL

## 2022-04-19 ENCOUNTER — APPOINTMENT (OUTPATIENT)
Dept: URGENT CARE | Facility: CLINIC | Age: 62
End: 2022-04-19
Payer: COMMERCIAL

## 2022-07-29 ENCOUNTER — MYC MEDICAL ADVICE (OUTPATIENT)
Dept: FAMILY MEDICINE | Facility: CLINIC | Age: 62
End: 2022-07-29

## 2022-09-06 ENCOUNTER — E-VISIT (OUTPATIENT)
Dept: FAMILY MEDICINE | Facility: CLINIC | Age: 62
End: 2022-09-06
Payer: COMMERCIAL

## 2022-09-06 DIAGNOSIS — R05.9 COUGH: Primary | ICD-10-CM

## 2022-09-06 PROCEDURE — 99207 PR NO CHARGE LOS: CPT | Performed by: INTERNAL MEDICINE

## 2022-09-09 ENCOUNTER — OFFICE VISIT (OUTPATIENT)
Dept: ORTHOPEDICS | Facility: CLINIC | Age: 62
End: 2022-09-09
Payer: COMMERCIAL

## 2022-09-09 VITALS — DIASTOLIC BLOOD PRESSURE: 86 MMHG | SYSTOLIC BLOOD PRESSURE: 142 MMHG | BODY MASS INDEX: 24.17 KG/M2 | HEIGHT: 76 IN

## 2022-09-09 DIAGNOSIS — G89.29 CHRONIC PAIN IN LEFT FOOT: Primary | ICD-10-CM

## 2022-09-09 DIAGNOSIS — M19.072 PRIMARY OSTEOARTHRITIS OF LEFT FOOT: ICD-10-CM

## 2022-09-09 DIAGNOSIS — M79.672 CHRONIC PAIN IN LEFT FOOT: Primary | ICD-10-CM

## 2022-09-09 PROCEDURE — 99203 OFFICE O/P NEW LOW 30 MIN: CPT | Performed by: STUDENT IN AN ORGANIZED HEALTH CARE EDUCATION/TRAINING PROGRAM

## 2022-09-09 NOTE — PROGRESS NOTES
ASSESSMENT & PLAN  Patient Instructions     1. Chronic pain in left foot    2. Primary osteoarthritis of left foot 1st tarsometatarsal joint      Wyatt Dhillon is a 62 year old male presenting for evaluation of a painful bump on the dorsum of his left foot. History, evaluation and XR imaging were reviewed, consistent with bone spurring due to moderate 1st TMT joint arthritis. This is not significantly bothersome for him at this time We discussed management options including activity modification, footwear changes to offload the area, anti-inflammatories, cortisone injection or surgical referral.     Detailed plan:  - Comfortable shoe gear  - Schedule a follow up visit for ultrasound-guided cortisone injection at any time if the arthritis pain flares.     You may call our direct clinic number ([phone]) at any time with questions or concerns.    Megan Medina MD, [facility]          -----    SUBJECTIVE  Wyatt Dhillon is a/an 62 year old male who is seen as a self referral for evaluation of left foot pain. The patient is seen by themselves.    Onset: 1 years(s) ago. Reports insidious onset without acute precipitating event.  Location of Pain: left dorsal medial foot  Rating of Pain at worst: 5/10  Rating of Pain Currently: 0/10  Worsened by: prolonged walking, certain footwear can cause pain  Better with: rest / activity avoidance  Treatments tried: rest/activity avoidance and ibuprofen  Associated symptoms: swelling  Orthopedic history: YES -  h/o left foot plantar fasciitis and OA (saw Dr. Molina in March 2021)  Relevant surgical history: NO  Social history: social history: works - executive and     Past Medical History:   Diagnosis Date     Anal fistula 2001    s/p repair     Carpal tunnel syndrome on both sides 2012     Cervical disc herniation 1986    C5-6     Elevated blood sugar      Essential hypertension 11/2019    added med then  "after 24 hour bp monitor, had cough and changed from acei to diovan, edema with norvasc     Gastroenteritis     while in [place]     Hx of colonoscopy 2008, 2019    nl     Insufficient sleep syndrome 2012    sleep test done     Normal stress echocardiogram 2002     Palpitations 11/2021    holter pvc's and pac's     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    one year of meds     Screening for heart disease 10/2019    ebct score of 0     Social History     Socioeconomic History     Marital status:      Number of children: 4   Tobacco Use     Smoking status: Never Smoker     Smokeless tobacco: Never Used   Substance and Sexual Activity     Alcohol use: Yes     Alcohol/week: 7.0 standard drinks     Types: 7 Standard drinks or equivalent per week     Comment: 15 a week     Drug use: No     Sexual activity: Yes     Partners: Female         Patient's past medical, surgical, social, and family histories were reviewed today and no changes are noted.    REVIEW OF SYSTEMS:  10 point ROS is negative other than symptoms noted above in HPI, Past Medical History or as stated below  Constitutional: NEGATIVE for fever, chills, change in weight  Skin: NEGATIVE for worrisome rashes, moles or lesions  GI/: NEGATIVE for bowel or bladder changes  Neuro: NEGATIVE for weakness, dizziness or paresthesias    OBJECTIVE:  BP (!) 142/86   Ht 1.918 m (6' 3.5\")   BMI 24.17 kg/m     General: healthy, alert and in no distress  HEENT: no scleral icterus or conjunctival erythema  Skin: no suspicious lesions or rash. No jaundice.  CV:  no pedal edema  Resp: normal respiratory effort without conversational dyspnea   Psych: normal mood and affect  Gait: normal steady gait with appropriate coordination and balance  Neuro: Normal light sensory exam of lower extremity  MSK:  LEFT FOOT  Inspection:    Bony prominence over the 1st TMT joint without overlying swelling or ecchymosis. Pes planus.   Palpation:    Mildly tender about the 1st TMT joint and plantar fascia. " Otherwise remainder of bony/ligamentous landmarks are non-tender.  Range of Motion:     Grossly intact and non-painful  Strength:    Grossly intact in all planes  Special Tests:    Positive: None    Negative: MTP stress and Lisfranc joint tenderness      Independent visualization of the below image:  Recent Results (from the past 24 hour(s))   XR Foot Left G/E 3 Views    Narrative    FOOT LEFT THREE OR MORE VIEWS    9/9/2022 2:23 PM     HISTORY:  Chronic pain in left foot    COMPARISON: None.      Impression    IMPRESSION: Moderate-sized Achilles enthesophyte. Moderate  osteoarthrosis at the first TMT joint. Deformity of the distal end of  the proximal phalanx of the great toe from old trauma and mild  osteoarthrosis of the interphalangeal joint of the great toe.    SUPRIYA OSBORNE MD         SYSTEM ID:  [id number]       Megan Medina MD [facility]

## 2022-09-09 NOTE — LETTER
9/9/2022         RE: Wyatt Dhillon  [address] [facility] [address]        Dear Colleague,    Thank you for referring your patient, Wyatt Dhillon, to the [facility] [place]. Please see a copy of my visit note below.    ASSESSMENT & PLAN  Patient Instructions     1. Chronic pain in left foot    2. Primary osteoarthritis of left foot 1st tarsometatarsal joint      Wyatt Dhillon is a 62 year old male presenting for evaluation of a painful bump on the dorsum of his left foot. History, evaluation and XR imaging were reviewed, consistent with bone spurring due to moderate 1st TMT joint arthritis. This is not significantly bothersome for him at this time We discussed management options including activity modification, footwear changes to offload the area, anti-inflammatories, cortisone injection or surgical referral.     Detailed plan:  - Comfortable shoe gear  - Schedule a follow up visit for ultrasound-guided cortisone injection at any time if the arthritis pain flares.     You may call our direct clinic number ([phone]) at any time with questions or concerns.    Megan Medina MD, [facility]          -----    SUBJECTIVE  Wyatt Dhillon is a/an 62 year old male who is seen as a self referral for evaluation of left foot pain. The patient is seen by themselves.    Onset: 1 years(s) ago. Reports insidious onset without acute precipitating event.  Location of Pain: left dorsal medial foot  Rating of Pain at worst: 5/10  Rating of Pain Currently: 0/10  Worsened by: prolonged walking, certain footwear can cause pain  Better with: rest / activity avoidance  Treatments tried: rest/activity avoidance and ibuprofen  Associated symptoms: swelling  Orthopedic history: YES -  h/o left foot plantar fasciitis and OA (saw Dr. Molina in March 2021)  Relevant surgical history: NO  Social history: social history: works -  "executive and     Past Medical History:   Diagnosis Date     Anal fistula 2001    s/p repair     Carpal tunnel syndrome on both sides 2012     Cervical disc herniation 1986    C5-6     Elevated blood sugar      Essential hypertension 11/2019    added med then after 24 hour bp monitor, had cough and changed from acei to diovan, edema with norvasc     Gastroenteritis     while in [place]     Hx of colonoscopy 2008, 2019    nl     Insufficient sleep syndrome 2012    sleep test done     Normal stress echocardiogram 2002     Palpitations 11/2021    holter pvc's and pac's     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    one year of meds     Screening for heart disease 10/2019    ebct score of 0     Social History     Socioeconomic History     Marital status:      Number of children: 4   Tobacco Use     Smoking status: Never Smoker     Smokeless tobacco: Never Used   Substance and Sexual Activity     Alcohol use: Yes     Alcohol/week: 7.0 standard drinks     Types: 7 Standard drinks or equivalent per week     Comment: 15 a week     Drug use: No     Sexual activity: Yes     Partners: Female         Patient's past medical, surgical, social, and family histories were reviewed today and no changes are noted.    REVIEW OF SYSTEMS:  10 point ROS is negative other than symptoms noted above in HPI, Past Medical History or as stated below  Constitutional: NEGATIVE for fever, chills, change in weight  Skin: NEGATIVE for worrisome rashes, moles or lesions  GI/: NEGATIVE for bowel or bladder changes  Neuro: NEGATIVE for weakness, dizziness or paresthesias    OBJECTIVE:  BP (!) 142/86   Ht 1.918 m (6' 3.5\")   BMI 24.17 kg/m     General: healthy, alert and in no distress  HEENT: no scleral icterus or conjunctival erythema  Skin: no suspicious lesions or rash. No jaundice.  CV:  no pedal edema  Resp: normal respiratory effort without conversational dyspnea   Psych: normal mood and affect  Gait: normal steady gait with appropriate " coordination and balance  Neuro: Normal light sensory exam of lower extremity  MSK:  LEFT FOOT  Inspection:    Bony prominence over the 1st TMT joint without overlying swelling or ecchymosis. Pes planus.   Palpation:    Mildly tender about the 1st TMT joint and plantar fascia. Otherwise remainder of bony/ligamentous landmarks are non-tender.  Range of Motion:     Grossly intact and non-painful  Strength:    Grossly intact in all planes  Special Tests:    Positive: None    Negative: MTP stress and Lisfranc joint tenderness      Independent visualization of the below image:  Recent Results (from the past 24 hour(s))   XR Foot Left G/E 3 Views    Narrative    FOOT LEFT THREE OR MORE VIEWS    9/9/2022 2:23 PM     HISTORY:  Chronic pain in left foot    COMPARISON: None.      Impression    IMPRESSION: Moderate-sized Achilles enthesophyte. Moderate  osteoarthrosis at the first TMT joint. Deformity of the distal end of  the proximal phalanx of the great toe from old trauma and mild  osteoarthrosis of the interphalangeal joint of the great toe.    SUPRIYA OSBORNE MD         SYSTEM ID:  [id number]       Megan Medina MD [facility]        Again, thank you for allowing me to participate in the care of your patient.        Sincerely,        Megan Medina MD

## 2022-09-09 NOTE — PATIENT INSTRUCTIONS
1. Chronic pain in left foot    2. Primary osteoarthritis of left foot 1st tarsometatarsal joint      Wyatt Dhillon is a 62 year old male presenting for evaluation of a painful bump on the dorsum of his left foot. History, evaluation and XR imaging were reviewed, consistent with bone spurring due to moderate 1st TMT joint arthritis. This is not significantly bothersome for him at this time We discussed management options including activity modification, footwear changes to offload the area, anti-inflammatories, cortisone injection or surgical referral.     Detailed plan:  - Comfortable shoe gear  - Schedule a follow up visit for ultrasound-guided cortisone injection at any time if the arthritis pain flares.     You may call our direct clinic number ([phone]) at any time with questions or concerns.    Megan Medina MD, [facility]

## 2022-09-13 ENCOUNTER — OFFICE VISIT (OUTPATIENT)
Dept: FAMILY MEDICINE | Facility: CLINIC | Age: 62
End: 2022-09-13
Payer: COMMERCIAL

## 2022-09-13 VITALS — DIASTOLIC BLOOD PRESSURE: 82 MMHG | SYSTOLIC BLOOD PRESSURE: 136 MMHG

## 2022-09-13 DIAGNOSIS — J06.9 VIRAL URI: Primary | ICD-10-CM

## 2022-09-13 PROCEDURE — 99213 OFFICE O/P EST LOW 20 MIN: CPT | Performed by: INTERNAL MEDICINE

## 2022-09-13 NOTE — PROGRESS NOTES
The patient is here for 2 issues.    Since January after his COVID he has had a raspy voice and clearing throat at times.  There is no pain.  This is fairly constant.  He is a non-smoker.    Additionally, in the last few days he has had a cold.  He does not have a fever.  No ear pain sinus pain or sore throat.  Minimal coughing.  He is tested negative for COVID twice.  His wife also has a cold.    Past Medical History:   Diagnosis Date     Anal fistula 2001    s/p repair     Carpal tunnel syndrome on both sides 2012     Cervical disc herniation 1986    C5-6     Elevated blood sugar      Essential hypertension 11/2019    added med then after 24 hour bp monitor, had cough and changed from acei to diovan, edema with norvasc     Gastroenteritis     while in [place]     Hx of colonoscopy 2008, 2019    nl     Insufficient sleep syndrome 2012    sleep test done     Normal stress echocardiogram 2002     Palpitations 11/2021    holter pvc's and pac's     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    one year of meds     Screening for heart disease 10/2019    ebct score of 0     Past Surgical History:   Procedure Laterality Date     COLONOSCOPY N/A 1/7/2019    Procedure: COLONOSCOPY;  Surgeon: Fco Cross MD;  Location:  GI     LAMINECTOMY LUMBAR ONE LEVEL Left 5/15/2019    Procedure: LEFT L 5  S1 DISCECTOMY ( SHEEHAN FRAME ; ERLINDA )  (MAC ANESTHESIA);  Surgeon: Angel Vera MD;  Location:  OR     ZZC REPAIR  ANAL FISTULA,RECT ADV FLAP  2001     Social History     Socioeconomic History     Marital status:      Spouse name: Not on file     Number of children: 4     Years of education: Not on file     Highest education level: Not on file   Occupational History     Not on file   Tobacco Use     Smoking status: Never Smoker     Smokeless tobacco: Never Used   Substance and Sexual Activity     Alcohol use: Yes     Alcohol/week: 7.0 standard drinks     Types: 7 Standard drinks or equivalent per week     Comment: 15 a week     Drug use:  No     Sexual activity: Yes     Partners: Female   Other Topics Concern     Parent/sibling w/ CABG, MI or angioplasty before 65F 55M? Not Asked   Social History Narrative     Not on file     Social Determinants of Health     Financial Resource Strain: Not on file   Food Insecurity: Not on file   Transportation Needs: Not on file   Physical Activity: Not on file   Stress: Not on file   Social Connections: Not on file   Intimate Partner Violence: Not on file   Housing Stability: Not on file     Current Outpatient Medications   Medication Sig Dispense Refill     ibuprofen (ADVIL/MOTRIN) 600 MG tablet Take 600 mg by mouth       LORazepam (ATIVAN) 1 MG tablet Take 1 tablet (1 mg) by mouth nightly as needed for anxiety 10 tablet 0     tadalafil (CIALIS) 5 MG tablet Take 1 tablet (5 mg) by mouth daily as needed (erectile dysfunction.) May increase the dose as needed with maximum dose 4 tablets (20 mg) within a 24 hour period. 20 tablet 0     valsartan (DIOVAN) 320 MG tablet Take 1 tablet (320 mg) by mouth daily 90 tablet 3     Allergies   Allergen Reactions     Ace Inhibitors Cough     Codeine Camsylate Rash     FAMILY HISTORY NOTED AND REVIEWED    REVIEW OF SYSTEMS: above    PHYSICAL EXAM    /82     Patient appears non toxic  Mouth within normal limits  Neck within normal limits  Lungs clear normal flow  tms and canals within normal limits.    ASSESSMENT:  1. Uri, viral, doubt covid  2. Throat clearing post covid, wonder about vocal cord issue, less likely gerd    PLAN:  Recurrent cold symptoms over-the-counter but call if it worsens or is not resolving soon.    For the other issue would like him to see ENT.    Wyatt Loco M.D.

## 2022-09-13 NOTE — PATIENT INSTRUCTIONS
I would see an ent for the voice/raspiness.  Dr. Terell Do or partner is very good.    Wyatt Loco M.D.

## 2022-11-26 DIAGNOSIS — I10 ESSENTIAL HYPERTENSION: ICD-10-CM

## 2022-11-28 RX ORDER — VALSARTAN 320 MG/1
TABLET ORAL
Qty: 90 TABLET | Refills: 0 | Status: SHIPPED | OUTPATIENT
Start: 2022-11-28 | End: 2022-11-29

## 2022-11-28 NOTE — TELEPHONE ENCOUNTER
Routing refill request to provider for review/approval because:      LOV: 9/13/22 with PCP     Future OV:    11/29/2022 8:00 AM Wyatt Loco MD [facility]     Grecia Watts RN  [facility]

## 2022-11-29 ENCOUNTER — OFFICE VISIT (OUTPATIENT)
Dept: FAMILY MEDICINE | Facility: CLINIC | Age: 62
End: 2022-11-29
Payer: COMMERCIAL

## 2022-11-29 VITALS
OXYGEN SATURATION: 97 % | DIASTOLIC BLOOD PRESSURE: 88 MMHG | BODY MASS INDEX: 26.11 KG/M2 | WEIGHT: 210 LBS | HEART RATE: 74 BPM | HEIGHT: 75 IN | RESPIRATION RATE: 16 BRPM | SYSTOLIC BLOOD PRESSURE: 136 MMHG | TEMPERATURE: 97.9 F

## 2022-11-29 DIAGNOSIS — Z00.00 ENCOUNTER FOR ANNUAL PHYSICAL EXAM: Primary | ICD-10-CM

## 2022-11-29 DIAGNOSIS — I10 ESSENTIAL HYPERTENSION: ICD-10-CM

## 2022-11-29 DIAGNOSIS — R73.9 ELEVATED BLOOD SUGAR: ICD-10-CM

## 2022-11-29 LAB
ALBUMIN SERPL BCG-MCNC: 4.4 G/DL (ref 3.5–5.2)
ALP SERPL-CCNC: 89 U/L (ref 40–129)
ALT SERPL W P-5'-P-CCNC: 35 U/L (ref 10–50)
ANION GAP SERPL CALCULATED.3IONS-SCNC: 9 MMOL/L (ref 7–15)
AST SERPL W P-5'-P-CCNC: 32 U/L (ref 10–50)
BILIRUB SERPL-MCNC: 0.7 MG/DL
BUN SERPL-MCNC: 18.5 MG/DL (ref 8–23)
CALCIUM SERPL-MCNC: 9.1 MG/DL (ref 8.8–10.2)
CHLORIDE SERPL-SCNC: 103 MMOL/L (ref 98–107)
CHOLEST SERPL-MCNC: 205 MG/DL
CREAT SERPL-MCNC: 1.05 MG/DL (ref 0.67–1.17)
DEPRECATED HCO3 PLAS-SCNC: 27 MMOL/L (ref 22–29)
ERYTHROCYTE [DISTWIDTH] IN BLOOD BY AUTOMATED COUNT: 12.9 % (ref 10–15)
GFR SERPL CREATININE-BSD FRML MDRD: 79 ML/MIN/1.73M2
GLUCOSE SERPL-MCNC: 119 MG/DL (ref 70–99)
HCT VFR BLD AUTO: 44 % (ref 40–53)
HDLC SERPL-MCNC: 72 MG/DL
HGB BLD-MCNC: 14.6 G/DL (ref 13.3–17.7)
LDLC SERPL CALC-MCNC: 109 MG/DL
MCH RBC QN AUTO: 31.6 PG (ref 26.5–33)
MCHC RBC AUTO-ENTMCNC: 33.2 G/DL (ref 31.5–36.5)
MCV RBC AUTO: 95 FL (ref 78–100)
NONHDLC SERPL-MCNC: 133 MG/DL
PLATELET # BLD AUTO: 187 10E3/UL (ref 150–450)
POTASSIUM SERPL-SCNC: 4.3 MMOL/L (ref 3.4–5.3)
PROT SERPL-MCNC: 7.2 G/DL (ref 6.4–8.3)
PSA SERPL-MCNC: 1.4 NG/ML (ref 0–4.5)
RBC # BLD AUTO: 4.62 10E6/UL (ref 4.4–5.9)
SODIUM SERPL-SCNC: 139 MMOL/L (ref 136–145)
TRIGL SERPL-MCNC: 121 MG/DL
WBC # BLD AUTO: 5.9 10E3/UL (ref 4–11)

## 2022-11-29 PROCEDURE — 99396 PREV VISIT EST AGE 40-64: CPT | Performed by: INTERNAL MEDICINE

## 2022-11-29 PROCEDURE — G0103 PSA SCREENING: HCPCS | Performed by: INTERNAL MEDICINE

## 2022-11-29 PROCEDURE — 85027 COMPLETE CBC AUTOMATED: CPT | Performed by: INTERNAL MEDICINE

## 2022-11-29 PROCEDURE — 80061 LIPID PANEL: CPT | Performed by: INTERNAL MEDICINE

## 2022-11-29 PROCEDURE — 99213 OFFICE O/P EST LOW 20 MIN: CPT | Mod: 25 | Performed by: INTERNAL MEDICINE

## 2022-11-29 PROCEDURE — 36415 COLL VENOUS BLD VENIPUNCTURE: CPT | Performed by: INTERNAL MEDICINE

## 2022-11-29 PROCEDURE — 80053 COMPREHEN METABOLIC PANEL: CPT | Performed by: INTERNAL MEDICINE

## 2022-11-29 RX ORDER — AMLODIPINE BESYLATE 2.5 MG/1
2.5 TABLET ORAL DAILY
Qty: 90 TABLET | Refills: 3 | Status: SHIPPED | OUTPATIENT
Start: 2022-11-29 | End: 2023-09-12

## 2022-11-29 RX ORDER — VALSARTAN 320 MG/1
320 TABLET ORAL DAILY
Qty: 90 TABLET | Refills: 3 | Status: SHIPPED | OUTPATIENT
Start: 2022-11-29 | End: 2023-12-19

## 2022-11-29 ASSESSMENT — PAIN SCALES - GENERAL: PAINLEVEL: MILD PAIN (2)

## 2022-11-29 NOTE — PROGRESS NOTES
SUBJECTIVE:   CC: Wyatt is an 62 year old who presents for preventative health visit.     Overall the patient is doing well and does workout regularly but his blood pressure tends to be on the higher end.  He takes his medicine.  He has no complaints.    Patient has been advised of split billing requirements and indicates understanding: Yes     Healthy Habits:     Taking medications regularly:  1    Barriers to taking medications:  Remembering to take    PHQ-2 Total Score: 0  History of Present Illness       Reason for visit:  Annual Physical Exam    He eats 4 or more servings of fruits and vegetables daily.He consumes 0 sweetened beverage(s) daily.He exercises with enough effort to increase his heart rate 20 to 29 minutes per day.  He exercises with enough effort to increase his heart rate 6 days per week. He is missing 1 dose(s) of medications per week.  He is not taking prescribed medications regularly due to remembering to take.              Today's PHQ-2 Score:   PHQ-2 ( 1999 Pfizer) 11/22/2022   Q1: Little interest or pleasure in doing things 0   Q2: Feeling down, depressed or hopeless 0   PHQ-2 Score 0   PHQ-2 Total Score (12-17 Years)- Positive if 3 or more points; Administer PHQ-A if positive -   Q1: Little interest or pleasure in doing things Not at all   Q2: Feeling down, depressed or hopeless Not at all   PHQ-2 Score 0           Social History     Tobacco Use     Smoking status: Never     Smokeless tobacco: Never   Substance Use Topics     Alcohol use: Yes     Alcohol/week: 7.0 standard drinks     Types: 7 Standard drinks or equivalent per week     If you drink alcohol do you typically have >3 drinks per day or >7 drinks per week? No               Past Medical History:      Past Medical History:   Diagnosis Date     Anal fistula 2001    s/p repair     Carpal tunnel syndrome on both sides 2012     Cervical disc herniation 1986    C5-6     Elevated blood sugar      Essential hypertension 11/2019    added  med then after 24 hour bp monitor, had cough and changed from acei to diovan, edema with norvasc     Gastroenteritis     while in [place]     Hx of colonoscopy 2008, 2019    nl     Insufficient sleep syndrome 2012    sleep test done     Normal stress echocardiogram 2002     Palpitations 11/2021    holter pvc's and pac's     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    one year of meds     Screening for heart disease 10/2019    ebct score of 0             Past Surgical History:      Past Surgical History:   Procedure Laterality Date     COLONOSCOPY N/A 1/7/2019    Procedure: COLONOSCOPY;  Surgeon: Fco Cross MD;  Location: [facility] GI     LAMINECTOMY LUMBAR ONE LEVEL Left 5/15/2019    Procedure: LEFT L 5  S1 DISCECTOMY ( SHEEHAN FRAME ; ERLINDA )  (MAC ANESTHESIA);  Surgeon: Angel Vera MD;  Location: [facility] OR     ZZC REPAIR  ANAL FISTULA,RECT ADV FLAP  2001             Social History:     Social History     Socioeconomic History     Marital status:      Spouse name: Not on file     Number of children: 4     Years of education: Not on file     Highest education level: Not on file   Occupational History     Not on file   Tobacco Use     Smoking status: Never     Smokeless tobacco: Never   Substance and Sexual Activity     Alcohol use: Yes     Alcohol/week: 7.0 standard drinks     Types: 7 Standard drinks or equivalent per week     Drug use: No     Sexual activity: Yes     Partners: Female   Other Topics Concern     Parent/sibling w/ CABG, MI or angioplasty before 65F 55M? Not Asked   Social History Narrative     Not on file     Social Determinants of Health     Financial Resource Strain: Not on file   Food Insecurity: Not on file   Transportation Needs: Not on file   Physical Activity: Not on file   Stress: Not on file   Social Connections: Not on file   Intimate Partner Violence: Not on file   Housing Stability: Not on file             Family History:   reviewed         Allergies:     Allergies   Allergen Reactions     Ace Inhibitors  "Cough     Codeine Camsylate Rash             Medications:     Current Outpatient Medications   Medication Sig Dispense Refill     amLODIPine (NORVASC) 2.5 MG tablet Take 1 tablet (2.5 mg) by mouth daily 90 tablet 3     DIPHENHYDRAMINE HCL PO Take 50 mg by mouth as needed       valsartan (DIOVAN) 320 MG tablet Take 1 tablet (320 mg) by mouth daily 90 tablet 3     ibuprofen (ADVIL/MOTRIN) 600 MG tablet Take 600 mg by mouth       LORazepam (ATIVAN) 1 MG tablet Take 1 tablet (1 mg) by mouth nightly as needed for anxiety 10 tablet 0     tadalafil (CIALIS) 5 MG tablet Take 1 tablet (5 mg) by mouth daily as needed (erectile dysfunction.) May increase the dose as needed with maximum dose 4 tablets (20 mg) within a 24 hour period. 20 tablet 0               Review of Systems:   The 10 point Review of Systems is negative other than noted in the HPI           Physical Exam:   Blood pressure 136/88, pulse 74, temperature 97.9  F (36.6  C), resp. rate 16, height 1.901 m (6' 2.84\"), weight 95.3 kg (210 lb), SpO2 97 %.    Exam:  Constitutional: healthy appearing, alert and in no distress  Heent: Normocephalic. Head without obvious masses or lesions. PERRLDC, EOMI. Mouth exam within normal limits: tongue, mucous membranes, posterior pharynx all normal, no lesions or abnormalities seen.  Tm's and canals within normal limits bilaterally. Neck supple, no nuchal rigidity or masses. No supraclavicular, or cervical adenopathy. Thyroid symmetric, no masses.  Cardiovascular: Regular rate and rhythm, no murmer, rub or gallops.  JVP not elevated, no edema.  Carotids within normal limits bilaterally, no bruits.  Respiratory: Normal respiratory effort.  Lungs clear, normal flow, no wheezing or crackles.  Breasts: Normal bilaterally.  No masses or lesions.  Nipples within normal limites.  No axillary lesions or nodes.  Gastrointestinal: Normal active bowel sounds.   Soft, not tender, no masses, guarding or rebound.  No hepatosplenomegaly. "   Genitourinary: Rectal min bph  Musculoskeletal: extremities normal, no gross deformities noted.  Skin: no suspicious lesions or rashes   Neurologic: Mental status within normal limits.  Speech fluent.  No gross motor abnormalities and gait intact.  Psychiatric: mentation appears normal and affect normal.         Data:   Labs sent        Assessment:   1. Normal complete physical exam  2. Hypertension, control not optimal, doubt 2nd cause  3. Elevated sugar, follow up labs  4. hcm         Plan:   Up to date immunizations, colon  Letter with labs  Add norvasc 2.5mg, if edema call  Monitor blood pressure and call if not around 130/80  Exercise, diet      Wyatt Loco M.D.

## 2022-11-29 NOTE — RESULT ENCOUNTER NOTE
Wyatt,    It was nice to see you.  You should be able to view all of your test results.    Your CBC your complete blood count is normal with no signs of anemia or blood disorders.  Your chemistry panel shows a slightly high sugar.  I am not sure if you were fasting but I am not worried.  Please be sure to exercise and try to get your weight down for this.  Your blood salts, kidney tests, liver tests, and PSA are all fine.    Your cholesterol is also a bit higher again this year.  Your HDL or good cholesterol is super and the LDL or bad cholesterol is adequate.  Again, exercise and weight loss for this.    Please let me know if you have questions.    Wyatt

## 2022-11-29 NOTE — PATIENT INSTRUCTIONS
Add the 2nd blood pressure medicine called amlodipine to the valsartan.  If possible take the new one in the evening.  If you have side effects let me know.  Let me know if your blood pressure is not staying around 130/80.    Wyatt Loco M.D.

## 2022-12-14 ENCOUNTER — TRANSFERRED RECORDS (OUTPATIENT)
Dept: HEALTH INFORMATION MANAGEMENT | Facility: CLINIC | Age: 62
End: 2022-12-14

## 2023-01-18 ENCOUNTER — VIRTUAL VISIT (OUTPATIENT)
Dept: FAMILY MEDICINE | Facility: CLINIC | Age: 63
End: 2023-01-18
Payer: COMMERCIAL

## 2023-01-18 ENCOUNTER — MYC MEDICAL ADVICE (OUTPATIENT)
Dept: FAMILY MEDICINE | Facility: CLINIC | Age: 63
End: 2023-01-18

## 2023-01-18 ENCOUNTER — ANCILLARY PROCEDURE (OUTPATIENT)
Dept: GENERAL RADIOLOGY | Facility: CLINIC | Age: 63
End: 2023-01-18
Attending: INTERNAL MEDICINE
Payer: COMMERCIAL

## 2023-01-18 DIAGNOSIS — U07.1 INFECTION DUE TO 2019 NOVEL CORONAVIRUS: Primary | ICD-10-CM

## 2023-01-18 DIAGNOSIS — U07.1 INFECTION DUE TO 2019 NOVEL CORONAVIRUS: ICD-10-CM

## 2023-01-18 PROCEDURE — 71046 X-RAY EXAM CHEST 2 VIEWS: CPT | Mod: TC | Performed by: RADIOLOGY

## 2023-01-18 PROCEDURE — 99213 OFFICE O/P EST LOW 20 MIN: CPT | Mod: CS | Performed by: INTERNAL MEDICINE

## 2023-01-18 RX ORDER — CHLORPHENIRAMINE MALEATE AND DEXTROMETHORPHAN HYDROBROMIDE 4; 30 MG/1; MG/1
1 TABLET, FILM COATED ORAL EVERY 6 HOURS PRN
Qty: 30 TABLET | Refills: 2 | Status: SHIPPED | OUTPATIENT
Start: 2023-01-18 | End: 2023-12-19

## 2023-01-18 RX ORDER — BENZONATATE 200 MG/1
200 CAPSULE ORAL 3 TIMES DAILY PRN
Qty: 42 CAPSULE | Refills: 0 | Status: SHIPPED | OUTPATIENT
Start: 2023-01-18 | End: 2023-12-19

## 2023-01-18 NOTE — RESULT ENCOUNTER NOTE
The following letter pertains to your most recent diagnostic tests:    Good news!  The chest x-ray is negative.  No evidence of pneumonia to explain the cough.      Sincerely,    Dr. Medina

## 2023-01-18 NOTE — PROGRESS NOTES
Wyatt is a 62 year old who is being evaluated via a billable video visit.      How would you like to obtain your AVS? MyChart  If the video visit is dropped, the invitation should be resent by: Text to cell phone: [phone]  Will anyone else be joining your video visit? No        Assessment & Plan     Infection due to 2019 novel coronavirus  Unfortunately, he is out of the window for starting [place] or other antiviral therapy  Can treat cough symptoms with below medications  I advised chest x-ray to exclude pneumonia as complications (I asked him to wear a high quality well fitting mask to the clinic)  Call if worsening or new symptoms  Cough may last for several more weeks even over a month, but trend should be toward improvement   - Chlorpheniramine-DM (CORICIDIN HBP COUGH/COLD) 4-30 MG TABS; Take 1 tablet by mouth every 6 hours as needed (nasal congestion and nasal drainage)  - benzonatate (TESSALON) 200 MG capsule; Take 1 capsule (200 mg) by mouth 3 times daily as needed for cough      16 minutes spent on the date of the encounter doing chart review, history and exam, documentation and further activities per the note             Return in about 1 day (around 1/19/2023) for CXR appt; call patient to schedule .  Patient instructed to return to clinic or contact us sooner if symptoms worsen or new symptoms develop.     Kvng Medina MD  [facility]    Gurmeet Schneider is a 62 year old, presenting for the following health issues:  Covid Concern (Positive 14th after return from [place] --see MY CHART message )      HPI       COVID-19 Symptom Review  How many days ago did these symptoms start? Cough 13th, chills 10th / 11th     Are any of the following symptoms significant for you?    New or worsening difficulty breathing? No    Worsening cough? Yes, I am coughing up mucus.    Fever or chills? Yes, I felt feverish or had chills.    Headache: No    Sore throat: No    Chest pain: No    Diarrhea:  No    Body aches? No    What treatments has patient tried? Mucinex    Does patient live in a nursing home, group home, or shelter? No  Does patient have a way to get food/medications during quarantined? Yes, I have a friend or family member who can help me. /  Drive thru     He has been having COVID symptoms for 7 days  He has developed a cough in the last few days which is moderate to severe and productive without current fevers or dyspnea  He is fully vaccinated and boosted           Review of Systems         Objective           Vitals:  No vitals were obtained today due to virtual visit.    Physical Exam   GENERAL: Healthy, alert and no distress  EYES: Eyes grossly normal to inspection.  No discharge or erythema, or obvious scleral/conjunctival abnormalities.  RESP: No audible wheeze, cough, or visible cyanosis.  No visible retractions or increased work of breathing.    SKIN: Visible skin clear. No significant rash, abnormal pigmentation or lesions.  NEURO: Cranial nerves grossly intact.  Mentation and speech appropriate for age.  PSYCH: Mentation appears normal, affect normal/bright, judgement and insight intact, normal speech and appearance well-groomed.                Video-Visit Details    Type of service:  Video Visit     Originating Location (pt. Location): Home  Distant Location (provider location):  On-site  Platform used for Video Visit: Ingrid

## 2023-07-10 ENCOUNTER — TELEPHONE (OUTPATIENT)
Dept: UROLOGY | Facility: CLINIC | Age: 63
End: 2023-07-10
Payer: COMMERCIAL

## 2023-07-10 NOTE — TELEPHONE ENCOUNTER
[facility] Call Center    Phone Message    May a detailed message be left on voicemail: yes     Reason for Call: Other: Pt called and stated no one had made him aware that he needed to be in [place] for his visit and would like to r/s maira/ Lai Garcia. Writer not pulling up providers template - please call pt to further discuss, thanks!    Action Taken: Message routed to:  [facility] ([facility] Uro    Travel Screening: Not Applicable

## 2023-07-11 NOTE — TELEPHONE ENCOUNTER
Disregard below message. Patient called back clinic must have updated providers schedule, writer was able to pull schedule and get patient scheduled with provider. Thank you

## 2023-07-13 ENCOUNTER — VIRTUAL VISIT (OUTPATIENT)
Dept: UROLOGY | Facility: CLINIC | Age: 63
End: 2023-07-13
Payer: COMMERCIAL

## 2023-07-13 DIAGNOSIS — F52.4 PREMATURE EJACULATION: ICD-10-CM

## 2023-07-13 DIAGNOSIS — N52.9 ERECTILE DYSFUNCTION, UNSPECIFIED ERECTILE DYSFUNCTION TYPE: Primary | ICD-10-CM

## 2023-07-13 PROCEDURE — 99214 OFFICE O/P EST MOD 30 MIN: CPT | Mod: 95 | Performed by: STUDENT IN AN ORGANIZED HEALTH CARE EDUCATION/TRAINING PROGRAM

## 2023-07-13 RX ORDER — TADALAFIL 5 MG/1
5 TABLET ORAL DAILY PRN
Qty: 90 TABLET | Refills: 3 | Status: SHIPPED | OUTPATIENT
Start: 2023-07-13

## 2023-07-13 NOTE — PROGRESS NOTES
Virtual Visit Details    Type of service:  Video Visit     Originating Location (pt. Location): Home    Distant Location (provider location):  Off-site  Platform used for Video Visit: Ingrid

## 2023-07-13 NOTE — PROGRESS NOTES
Consult conducted via real-time audio/video technology by Ruben Garcia PA-C from [facility] to the patient in their home. Patient consented to this billed visit that was started at 11:32 AM and completed at 11:48 AM.     REASON FOR VISIT  Erectile dysfunction follow-up      HISTORY OF PRESENT ILLNESS  Mr. Dhillon is a 63 year old male who I am speaking with today in follow-up for his erectile dysfunction and premature ejaculation.  He was last seen by Suzanne Galarza on 1/27/2022, at which time Cialis 5 mg as needed was prescribed with the option of taking up to 20 mg in a 24-hour timeframe.  He recently sent a message to the team on 6/3/2023 requesting a renewal of the tadalafil medication, but because it been over a year he needed a repeat visit with our team.     Regards to his premature ejaculation, it appears that he was interested in seeing Dr. Sood for this back on 2/25/2022, but it is unclear whether or not this was ever set up.  It does not appear he has tried anything for the premature ejaculation up to this point.     Today:    States the Cialis was helpful for erections and would like to continue it    Wonders what other options there are for premature ejaculation      PHYSICAL EXAMINATION  Deferred given virtual visit.     IMPRESSION  1. Erectile dysfunction  2. Premature ejaculation     It was my pleasure to speak with Wyatt in follow-up for his erectile dysfunction as well as his concerns for subjective premature ejaculation.  After reviewing his clinical history, I was happy to hear that he felt that Cialis did work for him when he had it, and I am happy to represcribe this medication.  We reviewed that the main side effects would potentially include stuffy nose, facial flushing, headaches, and very rarely vision changes.  He can continue taking this as needed 2 to 3 hours prior to anticipated sexual activity at a dosage of 5 to 20 mg.  We sent this to a [facility] near him to  drastically reduce the price.    In regards to premature ejaculation, we discussed different possible techniques including preintercourse ejaculation, squeeze technique, and the start and stop technique, as well as the possibility of topical numbing ointments.  At this time he did not request any specific interventions but thanked me for the information.    In regards to follow-up, we will plan to have a visit again in 1 year in order to renew his medications and be sure everything is still working well.Mr. Dhillon expressed understanding and agreement to the above discussion and plan and all of his questions were answered to his satisfaction.       PLAN    Renewal of Cialis     Follow-up in one year virtually     SIGNED     Ruben Garcia PA-C        I spent a total of 25 minutes spent on the date of the encounter doing chart review, history and exam, documentation, and further activities as noted above.

## 2023-07-13 NOTE — LETTER
7/13/2023       RE: Wyatt Dhillon  [address] [facility] [address]     Dear Colleague,    Thank you for referring your patient, Wyatt Dhillon, to the [facility] [place] [facility]. Please see a copy of my visit note below.    Consult conducted via real-time audio/video technology by Ruben Garcia PA-C from Clinic's and [facility] Center to the patient in their home. Patient consented to this billed visit that was started at 11:32 AM and completed at 11:48 AM.     REASON FOR VISIT  Erectile dysfunction follow-up      HISTORY OF PRESENT ILLNESS  Mr. Dhillon is a 63 year old male who I am speaking with today in follow-up for his erectile dysfunction and premature ejaculation.  He was last seen by Suzanne Galarza on 1/27/2022, at which time Cialis 5 mg as needed was prescribed with the option of taking up to 20 mg in a 24-hour timeframe.  He recently sent a message to the team on 6/3/2023 requesting a renewal of the tadalafil medication, but because it been over a year he needed a repeat visit with our team.     Regards to his premature ejaculation, it appears that he was interested in seeing Dr. Sood for this back on 2/25/2022, but it is unclear whether or not this was ever set up.  It does not appear he has tried anything for the premature ejaculation up to this point.     Today:  States the Cialis was helpful for erections and would like to continue it  Wonders what other options there are for premature ejaculation      PHYSICAL EXAMINATION  Deferred given virtual visit.     IMPRESSION  Erectile dysfunction  Premature ejaculation     It was my pleasure to speak with Wyatt in follow-up for his erectile dysfunction as well as his concerns for subjective premature ejaculation.  After reviewing his clinical history, I was happy to hear that he felt that Cialis did work for him when he had it, and I am happy to represcribe this  medication.  We reviewed that the main side effects would potentially include stuffy nose, facial flushing, headaches, and very rarely vision changes.  He can continue taking this as needed 2 to 3 hours prior to anticipated sexual activity at a dosage of 5 to 20 mg.  We sent this to a [facility] near him to drastically reduce the price.    In regards to premature ejaculation, we discussed different possible techniques including preintercourse ejaculation, squeeze technique, and the start and stop technique, as well as the possibility of topical numbing ointments.  At this time he did not request any specific interventions but thanked me for the information.    In regards to follow-up, we will plan to have a visit again in 1 year in order to renew his medications and be sure everything is still working well.Mr. Dhillon expressed understanding and agreement to the above discussion and plan and all of his questions were answered to his satisfaction.       PLAN  Renewal of Cialis   Follow-up in one year virtually     SIGNED     Ruben Garcia PA-C        I spent a total of 25 minutes spent on the date of the encounter doing chart review, history and exam, documentation, and further activities as noted above.    Virtual Visit Details    Type of service:  Video Visit     Originating Location (pt. Location): Home    Distant Location (provider location):  Off-site  Platform used for Video Visit: Ingrid

## 2023-07-13 NOTE — NURSING NOTE
Is the patient currently in the state of [place]? YES    Visit mode:VIDEO    If the visit is dropped, the patient can be reconnected by: VIDEO VISIT: Text to cell phone: [phone]    Will anyone else be joining the visit? NO      How would you like to obtain your AVS? MyChart    Are changes needed to the allergy or medication list? NO    Reason for visit: RECHECK (Follow-up ED)

## 2023-08-19 ENCOUNTER — MYC MEDICAL ADVICE (OUTPATIENT)
Dept: FAMILY MEDICINE | Facility: CLINIC | Age: 63
End: 2023-08-19
Payer: COMMERCIAL

## 2023-09-12 DIAGNOSIS — I10 ESSENTIAL HYPERTENSION: ICD-10-CM

## 2023-09-12 RX ORDER — AMLODIPINE BESYLATE 2.5 MG/1
2.5 TABLET ORAL DAILY
Qty: 90 TABLET | Refills: 0 | Status: SHIPPED | OUTPATIENT
Start: 2023-09-12 | End: 2023-12-01

## 2023-11-28 ENCOUNTER — APPOINTMENT (OUTPATIENT)
Dept: MRI IMAGING | Facility: CLINIC | Age: 63
End: 2023-11-28
Attending: SOCIAL WORKER
Payer: COMMERCIAL

## 2023-11-28 ENCOUNTER — HOSPITAL ENCOUNTER (EMERGENCY)
Facility: CLINIC | Age: 63
Discharge: HOME | End: 2023-11-28
Attending: SOCIAL WORKER | Admitting: SOCIAL WORKER
Payer: COMMERCIAL

## 2023-11-28 VITALS
RESPIRATION RATE: 17 BRPM | SYSTOLIC BLOOD PRESSURE: 140 MMHG | DIASTOLIC BLOOD PRESSURE: 90 MMHG | HEART RATE: 88 BPM | TEMPERATURE: 96.5 F | OXYGEN SATURATION: 100 %

## 2023-11-28 DIAGNOSIS — R42 VERTIGO: ICD-10-CM

## 2023-11-28 DIAGNOSIS — Z86.79 HISTORY OF HYPERTENSION: ICD-10-CM

## 2023-11-28 LAB
ALBUMIN SERPL BCG-MCNC: 4.3 G/DL (ref 3.5–5.2)
ALP SERPL-CCNC: 98 U/L (ref 40–150)
ALT SERPL W P-5'-P-CCNC: 33 U/L (ref 0–70)
ANION GAP SERPL CALCULATED.3IONS-SCNC: 16 MMOL/L (ref 7–15)
AST SERPL W P-5'-P-CCNC: 31 U/L (ref 0–45)
ATRIAL RATE - MUSE: 68 BPM
BASOPHILS # BLD AUTO: 0 10E3/UL (ref 0–0.2)
BASOPHILS NFR BLD AUTO: 1 %
BILIRUB SERPL-MCNC: 0.3 MG/DL
BUN SERPL-MCNC: 24.6 MG/DL (ref 8–23)
CALCIUM SERPL-MCNC: 9.3 MG/DL (ref 8.8–10.2)
CHLORIDE SERPL-SCNC: 102 MMOL/L (ref 98–107)
CREAT SERPL-MCNC: 0.9 MG/DL (ref 0.67–1.17)
DEPRECATED HCO3 PLAS-SCNC: 20 MMOL/L (ref 22–29)
DIASTOLIC BLOOD PRESSURE - MUSE: NORMAL MMHG
EGFRCR SERPLBLD CKD-EPI 2021: >90 ML/MIN/1.73M2
EOSINOPHIL # BLD AUTO: 0.1 10E3/UL (ref 0–0.7)
EOSINOPHIL NFR BLD AUTO: 1 %
ERYTHROCYTE [DISTWIDTH] IN BLOOD BY AUTOMATED COUNT: 12.6 % (ref 10–15)
GLUCOSE SERPL-MCNC: 190 MG/DL (ref 70–99)
HCT VFR BLD AUTO: 39.4 % (ref 40–53)
HGB BLD-MCNC: 13.7 G/DL (ref 13.3–17.7)
HOLD SPECIMEN: NORMAL
IMM GRANULOCYTES # BLD: 0 10E3/UL
IMM GRANULOCYTES NFR BLD: 0 %
INTERPRETATION ECG - MUSE: NORMAL
LYMPHOCYTES # BLD AUTO: 2.4 10E3/UL (ref 0.8–5.3)
LYMPHOCYTES NFR BLD AUTO: 41 %
MCH RBC QN AUTO: 33.1 PG (ref 26.5–33)
MCHC RBC AUTO-ENTMCNC: 34.8 G/DL (ref 31.5–36.5)
MCV RBC AUTO: 95 FL (ref 78–100)
MONOCYTES # BLD AUTO: 0.4 10E3/UL (ref 0–1.3)
MONOCYTES NFR BLD AUTO: 8 %
NEUTROPHILS # BLD AUTO: 2.9 10E3/UL (ref 1.6–8.3)
NEUTROPHILS NFR BLD AUTO: 49 %
NRBC # BLD AUTO: 0 10E3/UL
NRBC BLD AUTO-RTO: 0 /100
P AXIS - MUSE: 58 DEGREES
PLATELET # BLD AUTO: 164 10E3/UL (ref 150–450)
POTASSIUM SERPL-SCNC: 3.6 MMOL/L (ref 3.4–5.3)
PR INTERVAL - MUSE: 260 MS
PROT SERPL-MCNC: 7 G/DL (ref 6.4–8.3)
QRS DURATION - MUSE: 110 MS
QT - MUSE: 436 MS
QTC - MUSE: 463 MS
R AXIS - MUSE: 30 DEGREES
RBC # BLD AUTO: 4.14 10E6/UL (ref 4.4–5.9)
SODIUM SERPL-SCNC: 138 MMOL/L (ref 135–145)
SYSTOLIC BLOOD PRESSURE - MUSE: NORMAL MMHG
T AXIS - MUSE: 54 DEGREES
VENTRICULAR RATE- MUSE: 68 BPM
WBC # BLD AUTO: 5.8 10E3/UL (ref 4–11)

## 2023-11-28 PROCEDURE — 36415 COLL VENOUS BLD VENIPUNCTURE: CPT | Performed by: EMERGENCY MEDICINE

## 2023-11-28 PROCEDURE — 70546 MR ANGIOGRAPH HEAD W/O&W/DYE: CPT | Mod: XU

## 2023-11-28 PROCEDURE — 70549 MR ANGIOGRAPH NECK W/O&W/DYE: CPT

## 2023-11-28 PROCEDURE — 80053 COMPREHEN METABOLIC PANEL: CPT | Performed by: EMERGENCY MEDICINE

## 2023-11-28 PROCEDURE — 80053 COMPREHEN METABOLIC PANEL: CPT | Performed by: SOCIAL WORKER

## 2023-11-28 PROCEDURE — 250N000011 HC RX IP 250 OP 636: Mod: JZ | Performed by: SOCIAL WORKER

## 2023-11-28 PROCEDURE — 85025 COMPLETE CBC W/AUTO DIFF WBC: CPT | Performed by: SOCIAL WORKER

## 2023-11-28 PROCEDURE — 93005 ELECTROCARDIOGRAM TRACING: CPT

## 2023-11-28 PROCEDURE — A9585 GADOBUTROL INJECTION: HCPCS | Performed by: SOCIAL WORKER

## 2023-11-28 PROCEDURE — 96374 THER/PROPH/DIAG INJ IV PUSH: CPT | Mod: 59

## 2023-11-28 PROCEDURE — 96361 HYDRATE IV INFUSION ADD-ON: CPT

## 2023-11-28 PROCEDURE — 70553 MRI BRAIN STEM W/O & W/DYE: CPT

## 2023-11-28 PROCEDURE — 85025 COMPLETE CBC W/AUTO DIFF WBC: CPT | Performed by: EMERGENCY MEDICINE

## 2023-11-28 PROCEDURE — 99285 EMERGENCY DEPT VISIT HI MDM: CPT | Mod: 25

## 2023-11-28 PROCEDURE — 255N000002 HC RX 255 OP 636: Performed by: SOCIAL WORKER

## 2023-11-28 PROCEDURE — 258N000003 HC RX IP 258 OP 636: Performed by: SOCIAL WORKER

## 2023-11-28 PROCEDURE — 250N000013 HC RX MED GY IP 250 OP 250 PS 637: Performed by: SOCIAL WORKER

## 2023-11-28 RX ORDER — ONDANSETRON 2 MG/ML
4 INJECTION INTRAMUSCULAR; INTRAVENOUS
Status: COMPLETED | OUTPATIENT
Start: 2023-11-28 | End: 2023-11-28

## 2023-11-28 RX ORDER — GADOBUTROL 604.72 MG/ML
10 INJECTION INTRAVENOUS ONCE
Status: COMPLETED | OUTPATIENT
Start: 2023-11-28 | End: 2023-11-28

## 2023-11-28 RX ORDER — MECLIZINE HYDROCHLORIDE 25 MG/1
25 TABLET ORAL 3 TIMES DAILY PRN
Qty: 21 TABLET | Refills: 0 | Status: SHIPPED | OUTPATIENT
Start: 2023-11-28 | End: 2023-12-05

## 2023-11-28 RX ORDER — ACETAMINOPHEN 500 MG
1000 TABLET ORAL ONCE
Status: COMPLETED | OUTPATIENT
Start: 2023-11-28 | End: 2023-11-28

## 2023-11-28 RX ADMIN — ACETAMINOPHEN 1000 MG: 500 TABLET, FILM COATED ORAL at 07:09

## 2023-11-28 RX ADMIN — ONDANSETRON 4 MG: 2 INJECTION INTRAMUSCULAR; INTRAVENOUS at 07:08

## 2023-11-28 RX ADMIN — GADOBUTROL 10 ML: 604.72 INJECTION INTRAVENOUS at 12:47

## 2023-11-28 RX ADMIN — SODIUM CHLORIDE 1000 ML: 9 INJECTION, SOLUTION INTRAVENOUS at 07:07

## 2023-11-28 ASSESSMENT — ACTIVITIES OF DAILY LIVING (ADL)
ADLS_ACUITY_SCORE: 35
ADLS_ACUITY_SCORE: 33

## 2023-11-28 NOTE — CONSULTS
[facility]    Stroke Telephone Note    I was called by Antonia Sloan on 11/28/23 regarding patient Wyatt Dhillon. The patient is a 63 year old male with h/o occasional vertigo that started about 20 years ago. This morning around 1 AM after using the restroom, he had acute onset of vertigo that seemed very severe to him. He was diaphoretic and vomited shortly after. Chart review suggests he was not responding to her after he vomited. EMS was eventually called, and he was brought to the ED. Headache+. Mild R dysmetria on exam in the ED but no instability on ambulation    Vitals  BP: (!) 142/83   Pulse: 80   Resp: 14   Temp: (!) 96.5  F (35.8  C)        Imaging Findings  Imaging pending    Impression  Acute onset vertigo    Recommendations  - MRI Brain  - MRA head and Neck    UPDATE:  MRI with no e/o stroke  MRA Head and Neck with no e/o critical stenoses or LVO    Briefly discussed with patient re: today's presenting complaints and he said it was reminiscent of previous vertigo attacks, last one being in 2015. These attacks are usually followed by headaches and he has one currently with photo and phonophobia. Considering possible complex migraine with aura (vertigo). Also states vertigo episodes are typically triggered in the setting of dehydration or excessive alcohol/coffee consumption and he was out yesterday whole day for a football game and did not hydrate well although he did not consume much alcohol. No neuro deficits currently. Additionally he was diaphoretic and soiled himself with an episode of loose stools; the constellation of symptoms which are not typically seen with CVA related vertigo.    No further stroke work up needed.    My recommendations are based on the information provided over the phone by Wyatt COATES Chiobeverly's in-person providers. They are not intended to replace the clinical judgment of his in-person providers. I was not requested to personally see or examine  "the patient at this time.    My recommendations are based on the information provided over the phone by Wyatt Dhillon's in-person providers. They are not intended to replace the clinical judgment of his in-person providers. I was not requested to personally see or examine the patient at this time.    The Stroke Staff is Dr. Acharya.    Shira Simpson MD  Vascular Neurology Fellow    To page me or covering stroke neurology team member, click here: AMCOM  Choose \"On Call\" tab at top, then select \"NEUROLOGY/ALL SITES\" from middle drop-down box, press Enter, then look for \"stroke\" or \"telestroke\" for your site.   "

## 2023-11-28 NOTE — ED TRIAGE NOTES
BIBA from home c/o dizziness. Hx of vertigo >10 yrs ago and it feels the same. Per EMS pt was diaphoretic and had an episode of diarrhea. 4 mg of zofran given PTA.      Triage Assessment (Adult)       Row Name 11/28/23 0348          Triage Assessment    Airway WDL WDL        Respiratory WDL    Respiratory WDL WDL        Skin Circulation/Temperature WDL    Skin Circulation/Temperature WDL X;all     Skin Circulation pallor     Skin Temperature cool        Cardiac WDL    Cardiac WDL WDL        Peripheral/Neurovascular WDL    Peripheral Neurovascular WDL WDL        Cognitive/Neuro/Behavioral WDL    Cognitive/Neuro/Behavioral WDL WDL

## 2023-11-28 NOTE — DISCHARGE INSTRUCTIONS
You were seen in the emergency department for an episode of dizziness.  The MRI that were done here did not show any signs of an acute stroke or other abnormalities.  You were also seen by the neurology team here in the emergency department.     I have prescribed a medicine called meclizine for you that you can take when you are having an acute attack.  You can take this up to 3 times per day.  However if you find that you are taking this for more than 1 day, please do come back to the emergency department.    Please keep your appointment with your primary care doctor later this week.  Please discuss what happened today with your primary care doctor. I have also included the number for the ear nose throat doctors, if you have more frequent attacks it may be a good idea to follow up with them. We did also discuss that your EKG had an abnormality although it is not something that would be associated with your symptoms and is otherwise a normal variant from the emergency department perspective.    If you find that you have an attack that does not go away, if you are having any slurred speech, if you have any weakness in one particular part of your body, you have a facial droop, you are unable to walk, or if so much vomiting that you cannot keep anything down, please come back to the emergency department.

## 2023-11-28 NOTE — ED PROVIDER NOTES
History     Chief Complaint:  Dizziness       The history is provided by the spouse and the patient.      Wyatt Dhillon is a 63 year old male with a history of hypertension and vertigo who presents with dizziness. Yesterday was in his usual state of health, went to the [employer] game. Earlier this morning at approximately 0100, he was walking back to the bed after getting up to use the bathroom and became dizzy. When he got down in bed, the dizziness worsened and became very diaphoretic. Sometime afterwards, he had an episode of vomiting. Per his wife, she noticed that his emesis was dark and only liquid, but did not think there were any coffee ground appearance. No blood in stool or dark tarry stool. Patient seemed to be somewhat slow to respond after the episode of emesis. Also reported some difficulty walking. At bedside, he endorses having a headache. Reports four episodes of vertigo in the past but this current episode of dizziness was the worst he has ever experienced and last had one many years previously.. He denies unilateral weakness, fever, cough, chest pain, or palpitations.     Independent Historian:   The wife    Review of External Notes:   None      Medications:    Norvasc   Tessalon   Diphenhydramine   Cialis   Diovan     Past Medical History:    Anal fistula  Carpal tunnel syndrome on both sides  Cervical disc herniation  Essential hypertension  Gastroenteritis  Insufficient sleep syndrome  Palpitations  ED    Past Surgical History:    Lumbar laminectomy w/ discectomy   Anal fistula repair     Physical Exam   Patient Vitals for the past 24 hrs:   BP Temp Temp src Pulse Resp SpO2   11/28/23 0800 (!) 140/90 -- -- 88 17 100 %   11/28/23 0730 (!) 150/89 -- -- 82 12 99 %   11/28/23 0700 (!) 144/91 -- -- 76 12 98 %   11/28/23 0630 (!) 142/83 -- -- 80 14 100 %   11/28/23 0600 131/80 -- -- 72 13 100 %   11/28/23 0500 126/79 -- -- 76 12 99 %   11/28/23 0347 (!) 150/85 (!) 96.5  F (35.8  C) Temporal 72 12  100 %        Physical Exam  General: Overall stable and nontoxic appearing  HEENT: Conjunctivae clear, no scleral icterus, mucous membranes moist  Neuro: Alert and oriented to person, place, and time  Speech fluent, no confusion   PERRL, extraocular movements intact, no facial droop, smile symmetric, strength equal bilaterally over the face, sensation equal bilaterally of the face, tongue midline   strength equal and full bilaterally, flexion extension upper extremity equal bilaterally, normal sensation and equal bilaterally  Dorsiflexion plantarflexion full and equal, flexion extension lower extremity equal bilaterally, normal sensation and equal bilaterally  No pronator drift  No dysmetria on finger to nose, normal finger roll, no truncal ataxia    CV: Regular rate and rhythm, radial and DP pulses equal  Respiratory: No signs of respiratory distress, lungs clear to auscultation bilaterally   Abdomen: Soft, nontender nondistended without rigidity or rebound  MSK: No lower extremity swelling or tenderness       Emergency Department Course   ECG  ECG taken at 0343, ECG read at 0343  Sinus rhythm with 1st degree AV block  Otherwise normal ECG   Rate 68 bpm. UT interval 260 ms. QRS duration 110 ms. QT/QTc 436/463 ms. P-R-T axes 58 30 54.     Imaging:  MRA Neck (Carotids) wo & w Contrast    (Results Pending)   MRA Brain ([place]) wo & w Contrast    (Results Pending)   MR Brain w/o & w Contrast    (Results Pending)      Results per radiology     Laboratory:  Labs Ordered and Resulted from Time of ED Arrival to Time of ED Departure   COMPREHENSIVE METABOLIC PANEL - Abnormal       Result Value    Sodium 138      Potassium 3.6      Carbon Dioxide (CO2) 20 (*)     Anion Gap 16 (*)     Urea Nitrogen 24.6 (*)     Creatinine 0.90      GFR Estimate >90      Calcium 9.3      Chloride 102      Glucose 190 (*)     Alkaline Phosphatase 98      AST 31      ALT 33      Protein Total 7.0      Albumin 4.3      Bilirubin  Total 0.3     CBC WITH PLATELETS AND DIFFERENTIAL - Abnormal    WBC Count 5.8      RBC Count 4.14 (*)     Hemoglobin 13.7      Hematocrit 39.4 (*)     MCV 95      MCH 33.1 (*)     MCHC 34.8      RDW 12.6      Platelet Count 164      % Neutrophils 49      % Lymphocytes 41      % Monocytes 8      % Eosinophils 1      % Basophils 1      % Immature Granulocytes 0      NRBCs per 100 WBC 0      Absolute Neutrophils 2.9      Absolute Lymphocytes 2.4      Absolute Monocytes 0.4      Absolute Eosinophils 0.1      Absolute Basophils 0.0      Absolute Immature Granulocytes 0.0      Absolute NRBCs 0.0       Emergency Department Course & Assessments:    Interventions:  Medications   acetaminophen (TYLENOL) tablet 1,000 mg (1,000 mg Oral $Given 23 0709)   ondansetron (ZOFRAN) injection 4 mg (4 mg Intravenous $Given 23 0708)   sodium chloride 0.9% BOLUS 1,000 mL (0 mLs Intravenous Stopped 23 1059)   gadobutrol (GADAVIST) injection 10 mL (10 mLs Intravenous $Given 23 1247)      Assessments:  0633 I obtained history and examined the patient as noted above.  0759 I rechecked the patient and updated.  0828 I rechecked the patient and updated.  1449 I rechecked the patient and explained findings.    Independent Interpretation (X-rays, CTs, rhythm strip):  None    Consultations/Discussion of Management or Tests:  ED Course as of 23 1454   Tue 2023   0803 Reassessed, ambulating in room without any dizziness now    0823 I spoke with Stroke Neurology.   1430 Spoke with stroke neurology fellow, she has reviewed the MR images.    1449 Reassessed patient.  He has been able to eat and he has ambulated.  Feeling well, ready to go home.  Discussed reassuring workup with patient ad his wife. Will prescribe meclizine.     Social Determinants of Health affecting care:   None    Disposition:  The patient was discharged to home.     Impression & Plan    Medical Decision MakinyM with hx of HTN and history of  vertigo episodes years ago who presented to the ED with a chief complaint of dizziness associated with emesis and gait difficulties. Overall nontoxic and stable appearing, no focal deficit appreciated on neurologic exam. MR head/neck was negative and discussed with stroke neurology, cleared from stroke neuro perspective. Patient's symptoms resolved in his emergency department course and he was able to walk with steady gait and felt back to baseline, also able to tolerate PO. He had full ROM of the neck and no fevers, did not think that symptoms were consistent with meningitis/encephalitis. He had no chest pain, dyspnea or palpitations, do not think symptoms were consistent with cardiac etiology. CMP with mild anion gap elevation and decrease in bicarb however suspect this is in setting of vomiting, no history of diabetes and do not suspect DKA. No additional episodes of emesis in the emergency department and patient was tolerating fluids, anticipate that these will correct. Bun/Cr <36, reassuring against UGI bleed and no report of melena, abdomen soft and benign. Patient was prescribed meclizine and has a PCP appointment in two days, encouraged to keep this. Anticipatory guidance provided as well as given ENT referral if persistent episodes of vertigo occur. Discharged in stable condition.     Diagnosis:    ICD-10-CM    1. Vertigo  R42       2. History of hypertension  Z86.79            Discharge Medications:  New Prescriptions    No medications on file          Scribe Disclosure:  I, Louis Craig, am serving as a scribe at 6:46 AM on 11/28/2023 to document services personally performed by Antonia Barber MD based on my observations and the provider's statements to me.   11/28/2023   Antonia Barber MD Wu Klasek, Connie, MD  11/28/23 2002

## 2023-11-30 ENCOUNTER — NURSE TRIAGE (OUTPATIENT)
Dept: FAMILY MEDICINE | Facility: CLINIC | Age: 63
End: 2023-11-30

## 2023-11-30 NOTE — TELEPHONE ENCOUNTER
Attempted to contact pt to relay providers message from below. No answer. Left VM to call us back.     On callback- please relay providers message from below.        Patient Contact    Attempt # 1    Was call answered?  No.  Left message on voicemail with information to call me back.

## 2023-11-30 NOTE — TELEPHONE ENCOUNTER
TO PCP:     Beth - on [facility] called, pt was thinking he had a visit scheduled with you today but realized it's not until later in December     SITUATION: today has fever today 100.8 F and chills; dull headache  BACKGROUND: ER visit 11/28/23, brought in via EMS due to vomiting; ruled out stroke/heart - dx with severe vertigo attack   ASSESSMENT: yesterday had headache, better today. Abdomen and back sore from vomiting but denies abdominal pain, cough, sore throat, congestion, or other symptoms. COVID-19 testing negative. Spouse has a new onset cold but pt doesn't have symptoms in addition to fever/chills and dull headache. Mentioned history of vertigo - those symptoms are not present today. Working on hydrating    Was potentially exposed to granddaughter's COVID19 few days prior to thanksgiving but multiple negative tests since then   RECOMMENDATION: recommended against traveling tomorrow as he has a fever today. Pt was planning to fly to [place] tomorrow. Discussed monitoring temp and update us if new/worsening symptoms     Pt's spouse wanted to make sure PCP was aware of the ER report. States ED provider did not give a follow up recommendation. Declines team visit at this time. Asking if PCP wants pt seen sooner than scheduled in Dec?      Appointments in Next Year      Dec 19, 2023  8:00 AM  (Arrive by 7:40 AM)  Annual Wellness Visit with Wyatt Loco MD  [facility] ([facility] ) [phone]          Amanda MORGAN Triage RN  [facility]         Reason for Disposition   Fever with no signs of serious infection or localizing symptoms    Additional Information   Negative: Difficult to awaken or acting confused (e.g., disoriented, slurred speech)   Negative: Pale cold skin and very weak (can't stand)   Negative: Difficulty breathing and bluish (or gray) lips or face   Negative: New-onset rash with purple (or blood-colored) spots or dots    Negative: Sounds like a life-threatening emergency to the triager   Negative: Fever onset within 24 hours of receiving vaccine   Negative: Fever within 14 days of COVID-19 Exposure   Negative: Pregnant   Negative: Postpartum (from 0 to 6 weeks after delivery)   Negative: Headache and stiff neck (can't touch chin to chest)   Negative: Difficulty breathing   Negative: IV Drug Use (IVDU)   Negative: Fever > 103 F (39.4 C)   Negative: Fever > 100.0 F (37.8 C) and indwelling urinary catheter (e.g., Gauthier, coude)   Negative: Fever > 100.4 F (38.0 C) and has port (portacath), central line, or PICC line   Negative: Drinking very little and dehydration suspected (e.g., no urine > 12 hours, very dry mouth, very lightheaded)   Negative: Patient sounds very sick or weak to the triager   Negative: Fever > 101 F (38.3 C) and over 60 years of age   Negative: Fever > 100.0 F (37.8 C) and diabetes mellitus or a weak immune system (e.g., HIV positive, chemotherapy, splenectomy)   Negative: Fever > 100.0 F (37.8 C) and bedridden (e.g., CVA, chronic illness, recovering from surgery)   Negative: Fever > 100.4 F (38.0 C) and surgery in the past month   Negative: Transplant patient (e.g., liver, heart, lung, kidney)   Negative: Widespread rash and cause unknown   Negative: Severe chills (i.e., feeling extremely cold WITH shaking chills)   Negative: Patient wants to be seen   Negative: Fever present > 3 days (72 hours)   Negative: Fever comes and goes (intermittent) and lasts > 3 weeks   Negative: Fever > 100.0 F (37.8 C) and foreign travel to a developing country in the past month    Protocols used: Fever-A-OH

## 2023-11-30 NOTE — TELEPHONE ENCOUNTER
Covering for PCP. Out of office till next week. Seems as though things are improving. Close monitoring. Keep Dec appt. Team appointment if symptoms worsen in the meantime.

## 2023-11-30 NOTE — TELEPHONE ENCOUNTER
Patient's wife Tata (C2C) returning call, Writer relayed covering PCP's message below, patient expressed verbal understanding.    Ana Sunshine, RN  -[facility]

## 2023-12-01 ENCOUNTER — MYC REFILL (OUTPATIENT)
Dept: FAMILY MEDICINE | Facility: CLINIC | Age: 63
End: 2023-12-01
Payer: COMMERCIAL

## 2023-12-01 DIAGNOSIS — I10 ESSENTIAL HYPERTENSION: ICD-10-CM

## 2023-12-01 RX ORDER — AMLODIPINE BESYLATE 2.5 MG/1
2.5 TABLET ORAL DAILY
Qty: 90 TABLET | Refills: 0 | Status: SHIPPED | OUTPATIENT
Start: 2023-12-01 | End: 2023-12-19

## 2023-12-06 ENCOUNTER — NURSE TRIAGE (OUTPATIENT)
Dept: FAMILY MEDICINE | Facility: CLINIC | Age: 63
End: 2023-12-06
Payer: COMMERCIAL

## 2023-12-06 NOTE — TELEPHONE ENCOUNTER
PCP - checked scheduled and unfortunately no team openings until Friday this week. Please advise if patient should see urgent care today/tomorrow or team appt Friday?    Thank you!     Grecia Watts RN  [facility]

## 2023-12-06 NOTE — TELEPHONE ENCOUNTER
"Nurse Triage SBAR    Is this a 2nd Level Triage? NO    Situation: Pt calling in reporting a cough and worries for pneumonia. Pt reports having a fever over the weekend but currently afebrile.     Background: Pt was seen in ED last week for vertigo, pt then reports feeling sick the following days. Pt reports having a cough since Sunday. Pt reports coughing up yellow sputum. During one time while coughing pt reports having salvia \"go down the wrong pipe\". Pt states \"for the first time I actually felt my trachea constrict and had a little passage way for air to move\". Pt has had pneumonia in the past and does not want to get that bad again. Pt has taken multiple Covid tests all negative.     Assessment: Virus    Protocol Recommended Disposition:   See in Office Today or Tomorrow    Recommendation: Pt is unsure if he should wait it out at home or if he should be seen. Per protocol pt should be seen today or tomorrow.  Please advise.    Routed to provider    Does the patient meet one of the following criteria for ADS visit consideration? 16+ years old, with an [facility] PCP     TIP  Providers, please consider if this condition is appropriate for management at one of our Acute and Diagnostic Services sites.     If patient is a good candidate, please use dotphrase <dot>triageresponse and select Refer to ADS to document.     Reason for Disposition   Patient wants to be seen    Additional Information   Negative: Bluish (or gray) lips or face   Negative: SEVERE difficulty breathing (e.g., struggling for each breath, speaks in single words)   Negative: Rapid onset of cough and has hives   Negative: Coughing started suddenly after medicine, an allergic food or bee sting   Negative: Difficulty breathing after exposure to flames, smoke, or fumes   Negative: Sounds like a life-threatening emergency to the triager   Negative: Previous asthma attacks and this feels like asthma attack   Negative: Dry cough (non-productive; no sputum or " "minimal clear sputum) and within 14 days of COVID-19 Exposure   Negative: MODERATE difficulty breathing (e.g., speaks in phrases, SOB even at rest, pulse 100-120) and still present when not coughing   Negative: Chest pain present when not coughing   Negative: Passed out (i.e., fainted, collapsed and was not responding)   Negative: Patient sounds very sick or weak to the triager   Negative: MILD difficulty breathing (e.g., minimal/no SOB at rest, SOB with walking, pulse <100) and still present when not coughing   Negative: Coughed up > 1 tablespoon (15 ml) blood (Exception: Blood-tinged sputum.)   Negative: Fever > 103 F (39.4 C)   Negative: Fever > 101 F (38.3 C) and over 60 years of age   Negative: Fever > 100.0 F (37.8 C) and has diabetes mellitus or a weak immune system (e.g., HIV positive, cancer chemotherapy, organ transplant, splenectomy, chronic steroids)   Negative: Fever > 100.0 F (37.8 C) and bedridden (e.g., CVA, chronic illness, recovering from surgery)   Negative: Increasing ankle swelling   Negative: Wheezing is present   Negative: SEVERE coughing spells (e.g., whooping sound after coughing, vomiting after coughing)   Negative: Coughing up chantelle-colored (reddish-brown) or blood-tinged sputum   Negative: Fever present > 3 days (72 hours)   Negative: Fever returns after gone for over 24 hours and symptoms worse or not improved   Negative: Using nasal washes and pain medicine > 24 hours and sinus pain persists   Negative: Known COPD or other severe lung disease (i.e., bronchiectasis, cystic fibrosis, lung surgery) and worsening symptoms (i.e., increased sputum purulence or amount, increased breathing difficulty)   Negative: Continuous (nonstop) coughing interferes with work or school and no improvement using cough treatment per Care Advice    Answer Assessment - Initial Assessment Questions  1. ONSET: \"When did the cough begin?\"       Sunday     2. SEVERITY: \"How bad is the cough today?\"       Moderate - " "severe when it comes on     3. SPUTUM: \"Describe the color of your sputum\" (none, dry cough; clear, white, yellow, green)      Yellow     4. HEMOPTYSIS: \"Are you coughing up any blood?\" If so ask: \"How much?\" (flecks, streaks, tablespoons, etc.)      No    5. DIFFICULTY BREATHING: \"Are you having difficulty breathing?\" If Yes, ask: \"How bad is it?\" (e.g., mild, moderate, severe)     - MILD: No SOB at rest, mild SOB with walking, speaks normally in sentences, can lie down, no retractions, pulse < 100.     - MODERATE: SOB at rest, SOB with minimal exertion and prefers to sit, cannot lie down flat, speaks in phrases, mild retractions, audible wheezing, pulse 100-120.     - SEVERE: Very SOB at rest, speaks in single words, struggling to breathe, sitting hunched forward, retractions, pulse > 120       No-just one moment     6. FEVER: \"Do you have a fever?\" If Yes, ask: \"What is your temperature, how was it measured, and when did it start?\"      No     7. CARDIAC HISTORY: \"Do you have any history of heart disease?\" (e.g., heart attack, congestive heart failure)       Hypertension     8. LUNG HISTORY: \"Do you have any history of lung disease?\"  (e.g., pulmonary embolus, asthma, emphysema)      No     9. PE RISK FACTORS: \"Do you have a history of blood clots?\" (or: recent major surgery, recent prolonged travel, bedridden)      No    10. OTHER SYMPTOMS: \"Do you have any other symptoms?\" (e.g., runny nose, wheezing, chest pain)        Raspy voice, nosey is a little runny     11. PREGNANCY: \"Is there any chance you are pregnant?\" \"When was your last menstrual period?\"        N/A    12. TRAVEL: \"Have you traveled out of the country in the last month?\" (e.g., travel history, exposures)        No    Protocols used: Cough-A-OH      Anu Maxwell RN    "

## 2023-12-07 ENCOUNTER — OFFICE VISIT (OUTPATIENT)
Dept: FAMILY MEDICINE | Facility: CLINIC | Age: 63
End: 2023-12-07
Payer: COMMERCIAL

## 2023-12-07 VITALS
HEIGHT: 75 IN | RESPIRATION RATE: 16 BRPM | TEMPERATURE: 97.7 F | BODY MASS INDEX: 26.24 KG/M2 | OXYGEN SATURATION: 98 % | WEIGHT: 211 LBS | HEART RATE: 83 BPM | DIASTOLIC BLOOD PRESSURE: 77 MMHG | SYSTOLIC BLOOD PRESSURE: 124 MMHG

## 2023-12-07 DIAGNOSIS — R05.1 ACUTE COUGH: Primary | ICD-10-CM

## 2023-12-07 LAB
FLUAV RNA SPEC QL NAA+PROBE: NEGATIVE
FLUBV RNA RESP QL NAA+PROBE: NEGATIVE
RSV RNA SPEC NAA+PROBE: NEGATIVE
SARS-COV-2 RNA RESP QL NAA+PROBE: NEGATIVE

## 2023-12-07 PROCEDURE — 99213 OFFICE O/P EST LOW 20 MIN: CPT | Performed by: INTERNAL MEDICINE

## 2023-12-07 PROCEDURE — 87637 SARSCOV2&INF A&B&RSV AMP PRB: CPT | Performed by: INTERNAL MEDICINE

## 2023-12-07 ASSESSMENT — PAIN SCALES - GENERAL: PAINLEVEL: NO PAIN (0)

## 2023-12-07 NOTE — PROGRESS NOTES
The patient presents for cough.  Of significance he developed acute vertigo and was seen in the emergency room is noted at the end of November where MRI studies and labs are fine.  Shortly after that he developed fevers and chills without any URI symptoms or cough.  Then Saturday he developed a cough.  He does occasionally bring up some phlegm.  No significant sinus symptoms.  No fever since then.  No chest pain or shortness of breath.  No significant earaches.    Past Medical History:   Diagnosis Date    Anal fistula 2001    s/p repair    Carpal tunnel syndrome on both sides 2012    Cervical disc herniation 1986    C5-6    Dizziness 11/28/2023    seen [facility]sd er and mri's all negative, brain, cow and neck    Elevated blood sugar     Essential hypertension 11/2019    added med then after 24 hour bp monitor, had cough and changed from acei to diovan, edema with norvasc    Gastroenteritis     while in [place]    Hx of colonoscopy 2008, 2019    nl    Insufficient sleep syndrome 2012    sleep test done    Normal stress echocardiogram 2002    Palpitations 11/2021    holter pvc's and pac's    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    one year of meds    Screening for heart disease 10/2019    ebct score of 0     Past Surgical History:   Procedure Laterality Date    COLONOSCOPY N/A 1/7/2019    Procedure: COLONOSCOPY;  Surgeon: Fco Cross MD;  Location:  GI    LAMINECTOMY LUMBAR ONE LEVEL Left 5/15/2019    Procedure: LEFT L 5  S1 DISCECTOMY ( SISSY AGUILAR ; ERLINDA )  (MAC ANESTHESIA);  Surgeon: Angel Vera MD;  Location:  OR    ZZC REPAIR  ANAL FISTULA,RECT ADV FLAP  2001     Social History     Socioeconomic History    Marital status:      Spouse name: Not on file    Number of children: 4    Years of education: Not on file    Highest education level: Not on file   Occupational History    Not on file   Tobacco Use    Smoking status: Never    Smokeless tobacco: Never   Substance and Sexual Activity    Alcohol use: Yes      "Alcohol/week: 7.0 standard drinks of alcohol     Types: 7 Standard drinks or equivalent per week    Drug use: No    Sexual activity: Yes     Partners: Female   Other Topics Concern    Parent/sibling w/ CABG, MI or angioplasty before 65F 55M? Not Asked   Social History Narrative    Not on file     Social Determinants of Health     Financial Resource Strain: Not on file   Food Insecurity: Not on file   Transportation Needs: Not on file   Physical Activity: Not on file   Stress: Not on file   Social Connections: Not on file   Interpersonal Safety: Not on file   Housing Stability: Not on file     Current Outpatient Medications   Medication Sig Dispense Refill    amLODIPine (NORVASC) 2.5 MG tablet Take 1 tablet (2.5 mg) by mouth daily 90 tablet 0    tadalafil (CIALIS) 5 MG tablet Take 1 tablet (5 mg) by mouth daily as needed (Erectile dysfunction) 90 tablet 3    valsartan (DIOVAN) 320 MG tablet Take 1 tablet (320 mg) by mouth daily 90 tablet 3    benzonatate (TESSALON) 200 MG capsule Take 1 capsule (200 mg) by mouth 3 times daily as needed for cough 42 capsule 0    Chlorpheniramine-DM (CORICIDIN HBP COUGH/COLD) 4-30 MG TABS Take 1 tablet by mouth every 6 hours as needed (nasal congestion and nasal drainage) 30 tablet 2    DIPHENHYDRAMINE HCL PO Take 50 mg by mouth as needed       Allergies   Allergen Reactions    Ace Inhibitors Cough    Codeine Camsylate Rash     FAMILY HISTORY NOTED AND REVIEWED    REVIEW OF SYSTEMS: above    PHYSICAL EXAM    /77 (BP Location: Right arm, Patient Position: Sitting, Cuff Size: Adult Large)   Pulse 83   Temp 97.7  F (36.5  C) (Temporal)   Resp 16   Ht 1.901 m (6' 2.84\")   Wt 95.7 kg (211 lb)   SpO2 98%   BMI 26.49 kg/m      Patient appears non toxic  Tympanic membranes and canals: within normal limits bilaterally.   Mouth: Posterior pharynx, mucous membranes and tongue exam within normal limits.  Neck: supple, no nuchal rigidity or masses.  No anterior or posterior cervical " adenopathy.    Lungs: clear, normal flow and effort.      ASSESSMENT:  Probable viral illness, will check swab for COVID/RSV/flu.  Lungs are clear, afebrile so I really doubt pneumonia.      PLAN:  Swab today  Over-the-counter treatment  Call if he worsens or is not improving soon    Wyatt Loco M.D.

## 2023-12-07 NOTE — TELEPHONE ENCOUNTER
Thank you.  Please call the patient this morning.  If he still needs to be seen I can work him in at noon.    Thank you    Wyatt Loco M.D.

## 2023-12-07 NOTE — TELEPHONE ENCOUNTER
Patient called back and has been scheduled for appointment for today.     MASOOD George  [facility]

## 2023-12-07 NOTE — TELEPHONE ENCOUNTER
Patient Contact    Attempt # 1    Was call answered? No.    Left message for patient to call triage back.    Danica Greenwood RN

## 2023-12-07 NOTE — TELEPHONE ENCOUNTER
Appointments in Next Year      Dec 07, 2023 12:00 PM  (Arrive by 11:40 AM)  Provider Visit with Wyatt Loco MD  [facility] ([facility] ) [phone]

## 2023-12-08 NOTE — RESULT ENCOUNTER NOTE
Wyatt,    As you can see the tests are all negative.  I do believe this is likely viral but if you get worse or have a fever, or shortness of breath let me know.    Wyatt

## 2023-12-18 ASSESSMENT — ENCOUNTER SYMPTOMS
COUGH: 1
HEMATURIA: 0
NERVOUS/ANXIOUS: 0
MYALGIAS: 0
SORE THROAT: 0
PARESTHESIAS: 0
WEAKNESS: 0
HEADACHES: 0
HEARTBURN: 0
DIARRHEA: 0
CONSTIPATION: 0
FEVER: 0
CHILLS: 0
NAUSEA: 0
FREQUENCY: 0
ABDOMINAL PAIN: 0
HEMATOCHEZIA: 0
PALPITATIONS: 0
JOINT SWELLING: 0
DIZZINESS: 0
EYE PAIN: 0
SHORTNESS OF BREATH: 0
DYSURIA: 0
ARTHRALGIAS: 0

## 2023-12-18 ASSESSMENT — ACTIVITIES OF DAILY LIVING (ADL): CURRENT_FUNCTION: NO ASSISTANCE NEEDED

## 2023-12-18 NOTE — PROGRESS NOTES
SUBJECTIVE:   Wyatt is a 63 year old, presenting for the following:    He is due well overall and does workout.  He has a lingering cough but no fevers or breathing difficulty.  Is a bit better.  He otherwise feels fine without complaints.               Past Medical History:      Past Medical History:   Diagnosis Date    Anal fistula 2001    s/p repair    Carpal tunnel syndrome on both sides 2012    Cervical disc herniation 1986    C5-6    Dizziness 11/28/2023    seen fsd er and mri's all negative, brain, cow and neck    Elevated blood sugar     Essential hypertension 11/2019    added med then after 24 hour bp monitor, had cough and changed from acei to diovan, edema with norvasc    Gastroenteritis     while in [place]    Hx of colonoscopy 2008, 2019    nl    Insufficient sleep syndrome 2012    sleep test done    Normal stress echocardiogram 2002    Palpitations 11/2021    holter pvc's and pac's    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    one year of meds    Screening for heart disease 10/2019    ebct score of 0             Past Surgical History:      Past Surgical History:   Procedure Laterality Date    COLONOSCOPY N/A 1/7/2019    Procedure: COLONOSCOPY;  Surgeon: Fco Cross MD;  Location: [facility]    LAMINECTOMY LUMBAR ONE LEVEL Left 5/15/2019    Procedure: LEFT L 5  S1 DISCECTOMY ( SHEEHAN FRAME ; ERLINDA )  (MAC ANESTHESIA);  Surgeon: Angel Vera MD;  Location: [facility]    ZZC REPAIR  ANAL FISTULA,RECT ADV FLAP  2001             Social History:     Social History     Socioeconomic History    Marital status:      Spouse name: Not on file    Number of children: 4    Years of education: Not on file    Highest education level: Not on file   Occupational History    Not on file   Tobacco Use    Smoking status: Never    Smokeless tobacco: Never   Substance and Sexual Activity    Alcohol use: Yes     Alcohol/week: 7.0 standard drinks of alcohol     Types: 7 Standard drinks or equivalent per week    Drug use: No    Sexual activity:  Yes     Partners: Female     Birth control/protection: None     Comment: Menopause.   Other Topics Concern    Parent/sibling w/ CABG, MI or angioplasty before 65F 55M? No   Social History Narrative    Not on file     Social Determinants of Health     Financial Resource Strain: Low Risk  (12/18/2023)    Financial Resource Strain     Within the past 12 months, have you or your family members you live with been unable to get utilities (heat, electricity) when it was really needed?: No   Food Insecurity: Low Risk  (12/18/2023)    Food Insecurity     Within the past 12 months, did you worry that your food would run out before you got money to buy more?: No     Within the past 12 months, did the food you bought just not last and you didn t have money to get more?: No   Transportation Needs: Low Risk  (12/18/2023)    Transportation Needs     Within the past 12 months, has lack of transportation kept you from medical appointments, getting your medicines, non-medical meetings or appointments, work, or from getting things that you need?: No   Physical Activity: Not on file   Stress: Not on file   Social Connections: Not on file   Interpersonal Safety: Low Risk  (12/19/2023)    Interpersonal Safety     Do you feel physically and emotionally safe where you currently live?: Yes     Within the past 12 months, have you been hit, slapped, kicked or otherwise physically hurt by someone?: No     Within the past 12 months, have you been humiliated or emotionally abused in other ways by your partner or ex-partner?: No   Housing Stability: Low Risk  (12/18/2023)    Housing Stability     Do you have housing? : Yes     Are you worried about losing your housing?: No             Family History:   reviewed         Allergies:     Allergies   Allergen Reactions    Ace Inhibitors Cough    Codeine Camsylate Rash             Medications:     Current Outpatient Medications   Medication Sig Dispense Refill    amLODIPine (NORVASC) 2.5 MG tablet Take  "1 tablet (2.5 mg) by mouth daily 90 tablet 3    tadalafil (CIALIS) 5 MG tablet Take 1 tablet (5 mg) by mouth daily as needed (Erectile dysfunction) 90 tablet 3    valsartan (DIOVAN) 320 MG tablet Take 1 tablet (320 mg) by mouth daily 90 tablet 3               Review of Systems:     The 10 point Review of Systems is negative other than noted in the HPI           Physical Exam:   Blood pressure 130/84, pulse 80, resp. rate 16, height 1.9 m (6' 2.8\"), weight 94.8 kg (209 lb), SpO2 97%.    Exam:  Constitutional: healthy appearing, alert and in no distress  Heent: Normocephalic. Head without obvious masses or lesions. PERRLDC, EOMI. Mouth exam within normal limits: tongue, mucous membranes, posterior pharynx all normal, no lesions or abnormalities seen.  Tm's and canals within normal limits bilaterally. Neck supple, no nuchal rigidity or masses. No supraclavicular, or cervical adenopathy. Thyroid symmetric, no masses.  Cardiovascular: Regular rate and rhythm, no murmer, rub or gallops.  JVP not elevated, no edema.  Carotids within normal limits bilaterally, no bruits.  Respiratory: Normal respiratory effort.  Lungs clear, normal flow, no wheezing or crackles.  Breasts: Normal bilaterally.  No masses or lesions.  Nipples within normal limites.  No axillary lesions or nodes.  Gastrointestinal: Normal active bowel sounds.   Soft, not tender, no masses, guarding or rebound.  No hepatosplenomegaly.   Genitourinary: Rectal min to mod bph  Musculoskeletal: extremities normal, no gross deformities noted.  Skin: no suspicious lesions or rashes   Neurologic: Mental status within normal limits.  Speech fluent.  No gross motor abnormalities and gait intact.  Psychiatric: mentation appears normal and affect normal.         Data:   Labs sent, some done already, cxr to be done        Assessment:   Normal complete physical exam  Lingering cough, most consistent with viral URI, doubt other cause.  Elevated sugar, follow-up " "labs  Hypertension, controlled  Erectile dysfunction  Insufficient sleep syndrome  Healthcare maintenance         Plan:   RSV shot at pharmacy  Prevnar 20 today  Chest x-ray  Noted with labs  Exercise and diet  Call if cough is not improving      Wyatt Loco M.D.        Physical (fasting)    Are you in the first 12 months of your Medicare coverage?  Yes,  Visual Acuity:  Right Eye: 20/25   Left Eye: 20/32  Both Eyes: 20/25  Part B 06/01/2023    Healthy Habits:     In general, how would you rate your overall health?  Good    Frequency of exercise:  4-5 days/week    Duration of exercise:  30-45 minutes    Do you usually eat at least 4 servings of fruit and vegetables a day, include whole grains    & fiber and avoid regularly eating high fat or \"junk\" foods?  Yes    Taking medications regularly:  Yes    Medication side effects:  Not applicable    Ability to successfully perform activities of daily living:  No assistance needed    Home Safety:  No safety concerns identified    Hearing Impairment:  Difficulty following a conversation in a noisy restaurant or crowded room, difficulty following dialogue in the theater, need to ask people to speak up or repeat themselves, find that men's voices are easier to understand than woman's and difficulty understanding soft or whispered speech    In the past 6 months, have you been bothered by leaking of urine?  No    In general, how would you rate your overall mental or emotional health?  Excellent    Additional concerns today:  No      Have you ever done Advance Care Planning? (For example, a Health Directive, POLST, or a discussion with a medical provider or your loved ones about your wishes): Yes, advance care planning is on file.       Fall risk  Fallen 2 or more times in the past year?: No  Any fall with injury in the past year?: No    Cognitive Screening   1) Repeat 3 items (Leader, Season, Table)    2) Clock draw: NORMAL  3) 3 item recall: Recalls 3 objects  Results: 3 " items recalled: COGNITIVE IMPAIRMENT LESS LIKELY    Mini-CogTM Copyright JULIO Parra. Licensed by the author for use in [facility]; reprinted with permission (amber@[facility]). All rights reserved.        Reviewed and updated as needed this visit by clinical staff   Tobacco  Allergies  Meds  Problems     Soc Hx        Reviewed and updated as needed this visit by Provider    Allergies  Meds  Problems            Social History     Tobacco Use    Smoking status: Never    Smokeless tobacco: Never   Substance Use Topics    Alcohol use: Yes     Alcohol/week: 7.0 standard drinks of alcohol     Types: 7 Standard drinks or equivalent per week             12/18/2023     9:03 PM   Alcohol Use   Prescreen: >3 drinks/day or >7 drinks/week? Yes   AUDIT SCORE  5         12/18/2023     9:03 PM   AUDIT - Alcohol Use Disorders Identification Test - Reproduced from the World Health Organization Audit 2001 (Second Edition)   1.  How often do you have a drink containing alcohol? 4 or more times a week   2.  How many drinks containing alcohol do you have on a typical day when you are drinking? 1 or 2   3.  How often do you have five or more drinks on one occasion? Never   4.  How often during the last year have you found that you were not able to stop drinking once you had started? Never   5.  How often during the last year have you failed to do what was normally expected of you because of drinking? Never   6.  How often during the last year have you needed a first drink in the morning to get yourself going after a heavy drinking session? Never   7.  How often during the last year have you had a feeling of guilt or remorse after drinking? Less than monthly   8.  How often during the last year have you been unable to remember what happened the night before because of your drinking? Never   9.  Have you or someone else been injured because of your drinking? No   10. Has a relative, friend, doctor or other health care worker  been concerned about your drinking or suggested you cut down? No   TOTAL SCORE 5     Do you have a current opioid prescription? No  Do you use any other controlled substances or medications that are not prescribed by a provider? None              Current providers sharing in care for this patient include:   Patient Care Team:  Wyatt Loco MD as PCP - General (Internal Medicine)  Wyatt Loco MD as Assigned PCP  Suzanne Galarza PA as Physician Assistant (Urology)  Megan Koenig MD as Assigned Musculoskeletal Provider  Ruben Garcia PA-C as Physician Assistant (Physician Assistant - Medical)    The following health maintenance items are reviewed in Epic and correct as of today:  Health Maintenance   Topic Date Due    ANNUAL REVIEW OF HM ORDERS  Never done    RSV VACCINE (Pregnancy & 60+) (1 - 1-dose 60+ series) Never done    Pneumococcal Vaccine: 65+ Years (1 - PCV) Never done    AORTIC ANEURYSM SCREENING (SYSTEM ASSIGNED)  Never done    IPV IMMUNIZATION (2 of 3 - Adult catch-up series) 01/01/2080 (Originally 6/2/2011)    MEDICARE ANNUAL WELLNESS VISIT  12/19/2024    FALL RISK ASSESSMENT  12/19/2024    LIPID  11/29/2027    ADVANCE CARE PLANNING  12/18/2028    COLORECTAL CANCER SCREENING  01/07/2029    DTAP/TDAP/TD IMMUNIZATION (3 - Td or Tdap) 08/18/2032    HEPATITIS C SCREENING  Completed    HIV SCREENING  Completed    PHQ-2 (once per calendar year)  Completed    INFLUENZA VACCINE  Completed    ZOSTER IMMUNIZATION  Completed    COVID-19 Vaccine  Completed    HPV IMMUNIZATION  Aged Out    MENINGITIS IMMUNIZATION  Aged Out    RSV MONOCLONAL ANTIBODY  Aged Out               Review of Systems   Constitutional:  Negative for chills and fever.   HENT:  Negative for congestion, ear pain, hearing loss and sore throat.    Eyes:  Negative for pain and visual disturbance.   Respiratory:  Positive for cough. Negative for shortness of breath.    Cardiovascular:  Negative for chest pain,  "palpitations and peripheral edema.   Gastrointestinal:  Negative for abdominal pain, constipation, diarrhea, heartburn, hematochezia and nausea.   Genitourinary:  Positive for urgency. Negative for dysuria, frequency, genital sores, hematuria, impotence and penile discharge.   Musculoskeletal:  Negative for arthralgias, joint swelling and myalgias.   Skin:  Negative for rash.   Neurological:  Negative for dizziness, weakness, headaches and paresthesias.   Psychiatric/Behavioral:  Negative for mood changes. The patient is not nervous/anxious.          OBJECTIVE:   /84   Pulse 80   Resp 16   Ht 1.9 m (6' 2.8\")   Wt 94.8 kg (209 lb)   SpO2 97%   BMI 26.26 kg/m   Estimated body mass index is 26.26 kg/m  as calculated from the following:    Height as of this encounter: 1.9 m (6' 2.8\").    Weight as of this encounter: 94.8 kg (209 lb).  Physical Exam          ASSESSMENT / PLAN:             COUNSELING:  Reviewed preventive health counseling, as reflected in patient instructions       Regular exercise      BMI:   Estimated body mass index is 26.26 kg/m  as calculated from the following:    Height as of this encounter: 1.9 m (6' 2.8\").    Weight as of this encounter: 94.8 kg (209 lb).         He reports that he has never smoked. He has never used smokeless tobacco.      Appropriate preventive services were discussed with this patient, including applicable screening as appropriate for fall prevention, nutrition, physical activity, Tobacco-use cessation, weight loss and cognition.  Checklist reviewing preventive services available has been given to the patient.    Reviewed patients plan of care and provided an AVS. The Basic Care Plan (routine screening as documented in Health Maintenance) for Wyatt meets the Care Plan requirement. This Care Plan has been established and reviewed with the Patient.          Wyatt Loco MD  [facility]    Identified Health Risks:  I have reviewed Opioid Use " Disorder and Substance Use Disorder risk factors and made any needed referrals.

## 2023-12-19 ENCOUNTER — ANCILLARY PROCEDURE (OUTPATIENT)
Dept: GENERAL RADIOLOGY | Facility: CLINIC | Age: 63
End: 2023-12-19
Attending: INTERNAL MEDICINE
Payer: COMMERCIAL

## 2023-12-19 ENCOUNTER — OFFICE VISIT (OUTPATIENT)
Dept: FAMILY MEDICINE | Facility: CLINIC | Age: 63
End: 2023-12-19
Payer: COMMERCIAL

## 2023-12-19 VITALS
RESPIRATION RATE: 16 BRPM | BODY MASS INDEX: 25.99 KG/M2 | HEART RATE: 80 BPM | SYSTOLIC BLOOD PRESSURE: 130 MMHG | HEIGHT: 75 IN | DIASTOLIC BLOOD PRESSURE: 84 MMHG | OXYGEN SATURATION: 97 % | WEIGHT: 209 LBS

## 2023-12-19 DIAGNOSIS — I10 ESSENTIAL HYPERTENSION: ICD-10-CM

## 2023-12-19 DIAGNOSIS — R73.9 ELEVATED BLOOD SUGAR: ICD-10-CM

## 2023-12-19 DIAGNOSIS — Z23 NEED FOR VACCINATION: ICD-10-CM

## 2023-12-19 DIAGNOSIS — R05.1 ACUTE COUGH: ICD-10-CM

## 2023-12-19 DIAGNOSIS — Z00.00 MEDICARE ANNUAL WELLNESS VISIT, SUBSEQUENT: Primary | ICD-10-CM

## 2023-12-19 DIAGNOSIS — N52.9 ERECTILE DYSFUNCTION, UNSPECIFIED ERECTILE DYSFUNCTION TYPE: ICD-10-CM

## 2023-12-19 DIAGNOSIS — F51.12 INSUFFICIENT SLEEP SYNDROME: ICD-10-CM

## 2023-12-19 LAB
CHOLEST SERPL-MCNC: 175 MG/DL
FASTING STATUS PATIENT QL REPORTED: YES
HBA1C MFR BLD: 5.8 % (ref 0–5.6)
HDLC SERPL-MCNC: 64 MG/DL
LDLC SERPL CALC-MCNC: 80 MG/DL
NONHDLC SERPL-MCNC: 111 MG/DL
TRIGL SERPL-MCNC: 154 MG/DL

## 2023-12-19 PROCEDURE — G0402 INITIAL PREVENTIVE EXAM: HCPCS | Performed by: INTERNAL MEDICINE

## 2023-12-19 PROCEDURE — 71046 X-RAY EXAM CHEST 2 VIEWS: CPT | Mod: TC | Performed by: RADIOLOGY

## 2023-12-19 PROCEDURE — 83036 HEMOGLOBIN GLYCOSYLATED A1C: CPT | Performed by: INTERNAL MEDICINE

## 2023-12-19 PROCEDURE — 90677 PCV20 VACCINE IM: CPT | Performed by: INTERNAL MEDICINE

## 2023-12-19 PROCEDURE — G0009 ADMIN PNEUMOCOCCAL VACCINE: HCPCS | Performed by: INTERNAL MEDICINE

## 2023-12-19 PROCEDURE — 36415 COLL VENOUS BLD VENIPUNCTURE: CPT | Performed by: INTERNAL MEDICINE

## 2023-12-19 PROCEDURE — 80061 LIPID PANEL: CPT | Performed by: INTERNAL MEDICINE

## 2023-12-19 PROCEDURE — 99213 OFFICE O/P EST LOW 20 MIN: CPT | Mod: 25 | Performed by: INTERNAL MEDICINE

## 2023-12-19 RX ORDER — TADALAFIL 5 MG/1
5 TABLET ORAL DAILY PRN
Qty: 90 TABLET | Refills: 3 | Status: CANCELLED | OUTPATIENT
Start: 2023-12-19

## 2023-12-19 RX ORDER — AMLODIPINE BESYLATE 2.5 MG/1
2.5 TABLET ORAL DAILY
Qty: 90 TABLET | Refills: 3 | Status: SHIPPED | OUTPATIENT
Start: 2023-12-19

## 2023-12-19 RX ORDER — VALSARTAN 320 MG/1
320 TABLET ORAL DAILY
Qty: 90 TABLET | Refills: 3 | Status: SHIPPED | OUTPATIENT
Start: 2023-12-19

## 2023-12-19 ASSESSMENT — ENCOUNTER SYMPTOMS
COUGH: 1
PALPITATIONS: 0
FREQUENCY: 0
SHORTNESS OF BREATH: 0
HEMATURIA: 0
NAUSEA: 0
HEARTBURN: 0
PARESTHESIAS: 0
MYALGIAS: 0
HEADACHES: 0
ARTHRALGIAS: 0
JOINT SWELLING: 0
ABDOMINAL PAIN: 0
CONSTIPATION: 0
FEVER: 0
HEMATOCHEZIA: 0
DYSURIA: 0
NERVOUS/ANXIOUS: 0
DIZZINESS: 0
DIARRHEA: 0
SORE THROAT: 0
WEAKNESS: 0
EYE PAIN: 0
CHILLS: 0

## 2023-12-19 ASSESSMENT — PAIN SCALES - GENERAL: PAINLEVEL: NO PAIN (0)

## 2023-12-19 ASSESSMENT — ACTIVITIES OF DAILY LIVING (ADL): CURRENT_FUNCTION: NO ASSISTANCE NEEDED

## 2023-12-20 NOTE — RESULT ENCOUNTER NOTE
Wyatt,    You should be able to view your test results.  Your hemoglobin A1c your diabetes test is just slightly high at 5.8.  The best way to keep this down is regular exercise and a healthy diet such as the Mediterranean diet.  Keeping your weight down is also helpful.    Your total cholesterol is good at 175.  Your HDL or good cholesterol and LDL or bad cholesterol are good as well.  The triglycerides are now significant elevation.    Please let me know if you have questions and happy holidays.    I will

## 2024-09-23 ENCOUNTER — MYC MEDICAL ADVICE (OUTPATIENT)
Dept: FAMILY MEDICINE | Facility: CLINIC | Age: 64
End: 2024-09-23
Payer: COMMERCIAL

## 2024-09-24 NOTE — TELEPHONE ENCOUNTER
Timothy Schneider,  You are already scheduled with Dr. Loco on 12/20 at 9:30am.  Please check in at 9:10am.  Have a great day,  Ibis

## 2025-05-04 ENCOUNTER — HEALTH MAINTENANCE LETTER (OUTPATIENT)
Age: 65
End: 2025-05-04

## (undated) DEVICE — SUCTION MANIFOLD NEPTUNE SGL

## (undated) DEVICE — SU VICRYL 2-0 CT-1 27" J339H

## (undated) DEVICE — GLOVE PROTEXIS BLUE W/NEU-THERA 6.5  2D73EB65

## (undated) DEVICE — SU VICRYL 1 MO-4 18" J702D

## (undated) DEVICE — SPONGE SURGIFOAM 50

## (undated) DEVICE — SOL WATER IRRIG 1000ML BOTTLE 2F7114

## (undated) DEVICE — ESU ELEC BLADE 6" COATED/INSULATED E1455-6

## (undated) DEVICE — GLOVE PROTEXIS POWDER FREE 8.0 ORTHOPEDIC 2D73ET80

## (undated) DEVICE — LINEN TOWEL PACK X5 5464

## (undated) DEVICE — SU VICRYL 3-0 PS-1 18" UND J683

## (undated) DEVICE — PACK SPINE SM CUSTOM SNE15SSFSK

## (undated) DEVICE — DRSG ADAPTIC 3X3" 6112

## (undated) DEVICE — SYR 01ML 27GA 0.5" NDL TBC 309623

## (undated) DEVICE — ESU ELEC BLADE 4" COATED

## (undated) DEVICE — GLOVE PROTEXIS BLUE W/NEU-THERA 7.5  2D73EB75

## (undated) DEVICE — NDL 19GA 1.5"

## (undated) DEVICE — DRSG GAUZE 4X4" 3033

## (undated) DEVICE — GLOVE PROTEXIS POWDER FREE 6.5 ORTHOPEDIC 2D73ET65

## (undated) DEVICE — DRAPE SHEET REV FOLD 3/4 9349

## (undated) DEVICE — ESU GROUND PAD UNIVERSAL W/O CORD

## (undated) DEVICE — SYR 10ML FINGER CONTROL W/O NDL 309695

## (undated) RX ORDER — BUPIVACAINE HYDROCHLORIDE 5 MG/ML
INJECTION, SOLUTION EPIDURAL; INTRACAUDAL
Status: DISPENSED
Start: 2019-05-15

## (undated) RX ORDER — ACETAMINOPHEN 325 MG/1
TABLET ORAL
Status: DISPENSED
Start: 2019-05-15

## (undated) RX ORDER — FENTANYL CITRATE 50 UG/ML
INJECTION, SOLUTION INTRAMUSCULAR; INTRAVENOUS
Status: DISPENSED
Start: 2019-05-15

## (undated) RX ORDER — FENTANYL CITRATE 50 UG/ML
INJECTION, SOLUTION INTRAMUSCULAR; INTRAVENOUS
Status: DISPENSED
Start: 2019-01-07

## (undated) RX ORDER — DIPHENHYDRAMINE HYDROCHLORIDE 50 MG/ML
INJECTION INTRAMUSCULAR; INTRAVENOUS
Status: DISPENSED
Start: 2019-01-07

## (undated) RX ORDER — GABAPENTIN 300 MG/1
CAPSULE ORAL
Status: DISPENSED
Start: 2019-05-15

## (undated) RX ORDER — KETOROLAC TROMETHAMINE 30 MG/ML
INJECTION, SOLUTION INTRAMUSCULAR; INTRAVENOUS
Status: DISPENSED
Start: 2019-05-15

## (undated) RX ORDER — CEFAZOLIN SODIUM 1 G/3ML
INJECTION, POWDER, FOR SOLUTION INTRAMUSCULAR; INTRAVENOUS
Status: DISPENSED
Start: 2019-05-15

## (undated) RX ORDER — BETAMETHASONE SODIUM PHOSPHATE AND BETAMETHASONE ACETATE 3; 3 MG/ML; MG/ML
INJECTION, SUSPENSION INTRA-ARTICULAR; INTRALESIONAL; INTRAMUSCULAR; SOFT TISSUE
Status: DISPENSED
Start: 2019-05-15

## (undated) RX ORDER — CEFAZOLIN SODIUM 2 G/100ML
INJECTION, SOLUTION INTRAVENOUS
Status: DISPENSED
Start: 2019-05-15

## (undated) RX ORDER — LIDOCAINE HYDROCHLORIDE 10 MG/ML
INJECTION, SOLUTION EPIDURAL; INFILTRATION; INTRACAUDAL; PERINEURAL
Status: DISPENSED
Start: 2019-05-15

## (undated) RX ORDER — PROPOFOL 10 MG/ML
INJECTION, EMULSION INTRAVENOUS
Status: DISPENSED
Start: 2019-05-15